# Patient Record
Sex: FEMALE | Race: WHITE | Employment: UNEMPLOYED | URBAN - METROPOLITAN AREA
[De-identification: names, ages, dates, MRNs, and addresses within clinical notes are randomized per-mention and may not be internally consistent; named-entity substitution may affect disease eponyms.]

---

## 2017-02-21 DIAGNOSIS — I10 ESSENTIAL HYPERTENSION: ICD-10-CM

## 2017-02-22 RX ORDER — AMLODIPINE BESYLATE 5 MG/1
TABLET ORAL
Qty: 90 TABLET | Refills: 0 | Status: SHIPPED | OUTPATIENT
Start: 2017-02-22 | End: 2017-05-26 | Stop reason: SDUPTHER

## 2017-02-24 ENCOUNTER — OFFICE VISIT (OUTPATIENT)
Dept: FAMILY MEDICINE CLINIC | Age: 32
End: 2017-02-24

## 2017-02-24 VITALS
SYSTOLIC BLOOD PRESSURE: 134 MMHG | DIASTOLIC BLOOD PRESSURE: 89 MMHG | HEART RATE: 80 BPM | WEIGHT: 270 LBS | BODY MASS INDEX: 42.38 KG/M2 | HEIGHT: 67 IN

## 2017-02-24 DIAGNOSIS — I10 ESSENTIAL HYPERTENSION: ICD-10-CM

## 2017-02-24 DIAGNOSIS — G43.109 MIGRAINE WITH AURA AND WITHOUT STATUS MIGRAINOSUS, NOT INTRACTABLE: ICD-10-CM

## 2017-02-24 DIAGNOSIS — E66.01 SEVERE OBESITY (BMI 35.0-35.9 WITH COMORBIDITY) (HCC): Primary | ICD-10-CM

## 2017-02-24 LAB
A/G RATIO: 1.8 (ref 1.1–2.2)
ALBUMIN SERPL-MCNC: 4.7 G/DL (ref 3.4–5)
ALP BLD-CCNC: 51 U/L (ref 40–129)
ALT SERPL-CCNC: 29 U/L (ref 10–40)
ANION GAP SERPL CALCULATED.3IONS-SCNC: 17 MMOL/L (ref 3–16)
AST SERPL-CCNC: 30 U/L (ref 15–37)
BILIRUB SERPL-MCNC: 0.3 MG/DL (ref 0–1)
BUN BLDV-MCNC: 13 MG/DL (ref 7–20)
CALCIUM SERPL-MCNC: 9.5 MG/DL (ref 8.3–10.6)
CHLORIDE BLD-SCNC: 100 MMOL/L (ref 99–110)
CO2: 21 MMOL/L (ref 21–32)
CREAT SERPL-MCNC: 0.6 MG/DL (ref 0.6–1.1)
GFR AFRICAN AMERICAN: >60
GFR NON-AFRICAN AMERICAN: >60
GLOBULIN: 2.6 G/DL
GLUCOSE BLD-MCNC: 76 MG/DL (ref 70–99)
POTASSIUM SERPL-SCNC: 4.6 MMOL/L (ref 3.5–5.1)
SODIUM BLD-SCNC: 138 MMOL/L (ref 136–145)
TOTAL PROTEIN: 7.3 G/DL (ref 6.4–8.2)

## 2017-02-24 PROCEDURE — 36415 COLL VENOUS BLD VENIPUNCTURE: CPT | Performed by: FAMILY MEDICINE

## 2017-02-24 PROCEDURE — 99214 OFFICE O/P EST MOD 30 MIN: CPT | Performed by: FAMILY MEDICINE

## 2017-02-24 RX ORDER — TOPIRAMATE 25 MG/1
TABLET ORAL
Qty: 82 TABLET | Refills: 1 | Status: SHIPPED | OUTPATIENT
Start: 2017-02-24 | End: 2017-03-24 | Stop reason: SDUPTHER

## 2017-03-24 ENCOUNTER — OFFICE VISIT (OUTPATIENT)
Dept: FAMILY MEDICINE CLINIC | Age: 32
End: 2017-03-24

## 2017-03-24 VITALS
WEIGHT: 261 LBS | HEART RATE: 83 BPM | DIASTOLIC BLOOD PRESSURE: 85 MMHG | BODY MASS INDEX: 40.97 KG/M2 | SYSTOLIC BLOOD PRESSURE: 130 MMHG | HEIGHT: 67 IN

## 2017-03-24 DIAGNOSIS — E66.01 SEVERE OBESITY (BMI 35.0-35.9 WITH COMORBIDITY) (HCC): Primary | ICD-10-CM

## 2017-03-24 DIAGNOSIS — G43.109 MIGRAINE WITH AURA AND WITHOUT STATUS MIGRAINOSUS, NOT INTRACTABLE: ICD-10-CM

## 2017-03-24 DIAGNOSIS — I10 ESSENTIAL HYPERTENSION: ICD-10-CM

## 2017-03-24 LAB
A/G RATIO: 1.9 (ref 1.1–2.2)
ALBUMIN SERPL-MCNC: 4.9 G/DL (ref 3.4–5)
ALP BLD-CCNC: 53 U/L (ref 40–129)
ALT SERPL-CCNC: 22 U/L (ref 10–40)
ANION GAP SERPL CALCULATED.3IONS-SCNC: 14 MMOL/L (ref 3–16)
AST SERPL-CCNC: 23 U/L (ref 15–37)
BILIRUB SERPL-MCNC: <0.2 MG/DL (ref 0–1)
BUN BLDV-MCNC: 18 MG/DL (ref 7–20)
CALCIUM SERPL-MCNC: 9.7 MG/DL (ref 8.3–10.6)
CHLORIDE BLD-SCNC: 105 MMOL/L (ref 99–110)
CO2: 21 MMOL/L (ref 21–32)
CREAT SERPL-MCNC: 0.7 MG/DL (ref 0.6–1.1)
GFR AFRICAN AMERICAN: >60
GFR NON-AFRICAN AMERICAN: >60
GLOBULIN: 2.6 G/DL
GLUCOSE BLD-MCNC: 87 MG/DL (ref 70–99)
POTASSIUM SERPL-SCNC: 4.3 MMOL/L (ref 3.5–5.1)
SODIUM BLD-SCNC: 140 MMOL/L (ref 136–145)
TOTAL PROTEIN: 7.5 G/DL (ref 6.4–8.2)

## 2017-03-24 PROCEDURE — 99214 OFFICE O/P EST MOD 30 MIN: CPT | Performed by: FAMILY MEDICINE

## 2017-03-24 PROCEDURE — 36415 COLL VENOUS BLD VENIPUNCTURE: CPT | Performed by: FAMILY MEDICINE

## 2017-03-24 RX ORDER — TOPIRAMATE 50 MG/1
50 TABLET, FILM COATED ORAL 2 TIMES DAILY
Qty: 60 TABLET | Refills: 5 | Status: SHIPPED | OUTPATIENT
Start: 2017-03-24 | End: 2017-07-28 | Stop reason: SDUPTHER

## 2017-03-27 ENCOUNTER — TELEPHONE (OUTPATIENT)
Dept: FAMILY MEDICINE CLINIC | Age: 32
End: 2017-03-27

## 2017-03-30 ENCOUNTER — TELEPHONE (OUTPATIENT)
Dept: FAMILY MEDICINE CLINIC | Age: 32
End: 2017-03-30

## 2017-04-18 ENCOUNTER — OFFICE VISIT (OUTPATIENT)
Dept: DERMATOLOGY | Age: 32
End: 2017-04-18

## 2017-04-18 DIAGNOSIS — Z12.83 SCREENING EXAM FOR SKIN CANCER: ICD-10-CM

## 2017-04-18 DIAGNOSIS — L83 ACQUIRED ACANTHOSIS NIGRICANS: ICD-10-CM

## 2017-04-18 DIAGNOSIS — L72.11 PILAR CYST: ICD-10-CM

## 2017-04-18 DIAGNOSIS — D22.9 MULTIPLE BENIGN NEVI: Primary | ICD-10-CM

## 2017-04-18 DIAGNOSIS — D18.01 CHERRY ANGIOMA: ICD-10-CM

## 2017-04-18 PROCEDURE — 99243 OFF/OP CNSLTJ NEW/EST LOW 30: CPT | Performed by: DERMATOLOGY

## 2017-04-25 RX ORDER — HYDROCHLOROTHIAZIDE 12.5 MG/1
CAPSULE, GELATIN COATED ORAL
Qty: 90 CAPSULE | Refills: 0 | Status: SHIPPED | OUTPATIENT
Start: 2017-04-25 | End: 2017-07-28 | Stop reason: SDUPTHER

## 2017-05-26 DIAGNOSIS — I10 ESSENTIAL HYPERTENSION: ICD-10-CM

## 2017-05-26 RX ORDER — AMLODIPINE BESYLATE 5 MG/1
TABLET ORAL
Qty: 30 TABLET | Refills: 0 | Status: SHIPPED | OUTPATIENT
Start: 2017-05-26 | End: 2017-07-05 | Stop reason: SDUPTHER

## 2017-07-05 DIAGNOSIS — I10 ESSENTIAL HYPERTENSION: ICD-10-CM

## 2017-07-07 RX ORDER — AMLODIPINE BESYLATE 5 MG/1
TABLET ORAL
Qty: 30 TABLET | Refills: 0 | Status: SHIPPED | OUTPATIENT
Start: 2017-07-07 | End: 2017-07-28 | Stop reason: SDUPTHER

## 2017-07-28 ENCOUNTER — OFFICE VISIT (OUTPATIENT)
Dept: FAMILY MEDICINE CLINIC | Age: 32
End: 2017-07-28

## 2017-07-28 VITALS
HEIGHT: 67 IN | SYSTOLIC BLOOD PRESSURE: 122 MMHG | HEART RATE: 72 BPM | RESPIRATION RATE: 12 BRPM | DIASTOLIC BLOOD PRESSURE: 72 MMHG | BODY MASS INDEX: 40.81 KG/M2 | WEIGHT: 260 LBS

## 2017-07-28 DIAGNOSIS — I10 ESSENTIAL HYPERTENSION: ICD-10-CM

## 2017-07-28 DIAGNOSIS — G43.109 MIGRAINE WITH AURA AND WITHOUT STATUS MIGRAINOSUS, NOT INTRACTABLE: ICD-10-CM

## 2017-07-28 PROCEDURE — 99213 OFFICE O/P EST LOW 20 MIN: CPT | Performed by: FAMILY MEDICINE

## 2017-07-28 RX ORDER — HYDROCHLOROTHIAZIDE 12.5 MG/1
CAPSULE, GELATIN COATED ORAL
Qty: 90 CAPSULE | Refills: 1 | Status: SHIPPED | OUTPATIENT
Start: 2017-07-28 | End: 2018-02-02 | Stop reason: SDUPTHER

## 2017-07-28 RX ORDER — AMLODIPINE BESYLATE 5 MG/1
TABLET ORAL
Qty: 90 TABLET | Refills: 1 | Status: SHIPPED | OUTPATIENT
Start: 2017-07-28 | End: 2018-02-02 | Stop reason: SDUPTHER

## 2017-07-28 RX ORDER — TOPIRAMATE 50 MG/1
50 TABLET, FILM COATED ORAL 2 TIMES DAILY
Qty: 180 TABLET | Refills: 1 | Status: SHIPPED | OUTPATIENT
Start: 2017-07-28 | End: 2018-02-02 | Stop reason: SDUPTHER

## 2017-07-28 ASSESSMENT — PATIENT HEALTH QUESTIONNAIRE - PHQ9
1. LITTLE INTEREST OR PLEASURE IN DOING THINGS: 0
SUM OF ALL RESPONSES TO PHQ QUESTIONS 1-9: 0
2. FEELING DOWN, DEPRESSED OR HOPELESS: 0
SUM OF ALL RESPONSES TO PHQ9 QUESTIONS 1 & 2: 0

## 2017-08-25 ENCOUNTER — OFFICE VISIT (OUTPATIENT)
Dept: FAMILY MEDICINE CLINIC | Age: 32
End: 2017-08-25

## 2017-08-25 ENCOUNTER — TELEPHONE (OUTPATIENT)
Dept: FAMILY MEDICINE CLINIC | Age: 32
End: 2017-08-25

## 2017-08-25 VITALS
SYSTOLIC BLOOD PRESSURE: 138 MMHG | BODY MASS INDEX: 40.81 KG/M2 | HEIGHT: 67 IN | HEART RATE: 82 BPM | DIASTOLIC BLOOD PRESSURE: 80 MMHG | WEIGHT: 260 LBS

## 2017-08-25 DIAGNOSIS — M79.604 PAIN OF RIGHT LOWER EXTREMITY: Primary | ICD-10-CM

## 2017-08-25 LAB — D DIMER: <200 NG/ML DDU (ref 0–229)

## 2017-08-25 PROCEDURE — 99214 OFFICE O/P EST MOD 30 MIN: CPT | Performed by: FAMILY MEDICINE

## 2018-02-02 ENCOUNTER — OFFICE VISIT (OUTPATIENT)
Dept: FAMILY MEDICINE CLINIC | Age: 33
End: 2018-02-02

## 2018-02-02 VITALS
HEART RATE: 89 BPM | RESPIRATION RATE: 12 BRPM | DIASTOLIC BLOOD PRESSURE: 72 MMHG | BODY MASS INDEX: 42.22 KG/M2 | WEIGHT: 269 LBS | SYSTOLIC BLOOD PRESSURE: 126 MMHG | HEIGHT: 67 IN | OXYGEN SATURATION: 98 %

## 2018-02-02 DIAGNOSIS — I10 ESSENTIAL HYPERTENSION: Primary | ICD-10-CM

## 2018-02-02 DIAGNOSIS — G43.109 MIGRAINE WITH AURA AND WITHOUT STATUS MIGRAINOSUS, NOT INTRACTABLE: ICD-10-CM

## 2018-02-02 LAB
ANION GAP SERPL CALCULATED.3IONS-SCNC: 14 MMOL/L (ref 3–16)
BUN BLDV-MCNC: 16 MG/DL (ref 7–20)
CALCIUM SERPL-MCNC: 9.4 MG/DL (ref 8.3–10.6)
CHLORIDE BLD-SCNC: 104 MMOL/L (ref 99–110)
CO2: 24 MMOL/L (ref 21–32)
CREAT SERPL-MCNC: 0.7 MG/DL (ref 0.6–1.1)
GFR AFRICAN AMERICAN: >60
GFR NON-AFRICAN AMERICAN: >60
GLUCOSE BLD-MCNC: 89 MG/DL (ref 70–99)
POTASSIUM SERPL-SCNC: 4.3 MMOL/L (ref 3.5–5.1)
SODIUM BLD-SCNC: 142 MMOL/L (ref 136–145)

## 2018-02-02 PROCEDURE — 99213 OFFICE O/P EST LOW 20 MIN: CPT | Performed by: FAMILY MEDICINE

## 2018-02-02 RX ORDER — HYDROCHLOROTHIAZIDE 12.5 MG/1
CAPSULE, GELATIN COATED ORAL
Qty: 90 CAPSULE | Refills: 1 | Status: SHIPPED | OUTPATIENT
Start: 2018-02-02 | End: 2018-09-01 | Stop reason: SDUPTHER

## 2018-02-02 RX ORDER — TOPIRAMATE 50 MG/1
50 TABLET, FILM COATED ORAL 2 TIMES DAILY
Qty: 180 TABLET | Refills: 1 | Status: SHIPPED | OUTPATIENT
Start: 2018-02-02 | End: 2018-09-21 | Stop reason: ALTCHOICE

## 2018-02-02 RX ORDER — AMLODIPINE BESYLATE 5 MG/1
TABLET ORAL
Qty: 90 TABLET | Refills: 1 | Status: SHIPPED | OUTPATIENT
Start: 2018-02-02 | End: 2018-09-01 | Stop reason: SDUPTHER

## 2018-02-02 NOTE — PROGRESS NOTES
Subjective:      Patient ID: German Willis is a 33 y.o. female. HPI    CC:  HTN, migraine    Chronic Headache:  Patient presents for follow-up of migraine headache. Episodes have been occuring about 0 times per day, and have been stable over time. Recent change in symptom quality or new associated symptoms:  no.  Current triggers:  nothing in particular. Current abortive treatment includes triptan- imitrex. Preventive treatment: anti-convulsant- topamax 50mg BID. Medication side effects: none. Emergency department/urgent care visits for headache since last visit: none. Recent diagnostic testing: none. Hypertension:  Home blood pressure monitoring: No.  She is adherent to a low sodium diet. Patient denies chest pain, shortness of breath, headache, lightheadedness, blurred vision, peripheral edema, palpitations, dry cough and fatigue. Antihypertensive medication side effects: no medication side effects noted. Use of agents associated with hypertension: none. Pt did gain weight: She states she was going out to eat too much with coworkers at lunch. Recently she's been packing her lunch. She also signed up with the program with a dietician at [employer]. It's a 9 week program in which they help teach her about food and help go shopping with you to make sure you're making healthy food choices.     Sodium (mmol/L)   Date Value   03/24/2017 140    BUN (mg/dL)   Date Value   03/24/2017 18    Glucose (mg/dL)   Date Value   03/24/2017 87      Potassium (mmol/L)   Date Value   03/24/2017 4.3    CREATININE (mg/dL)   Date Value   03/24/2017 0.7           Vitals:    02/02/18 0755   BP: 126/72   Pulse: 89   Resp: 12   SpO2: 98%   Weight: 269 lb (122 kg)   Height: 5' 7\" (1.702 m)       Outpatient Prescriptions Marked as Taking for the 2/2/18 encounter (Office Visit) with Mariya Hernandez MD   Medication Sig Dispense Refill    amLODIPine (NORVASC) 5 MG tablet TAKE ONE

## 2018-02-04 ENCOUNTER — TELEPHONE (OUTPATIENT)
Dept: FAMILY MEDICINE CLINIC | Age: 33
End: 2018-02-04

## 2018-04-24 ENCOUNTER — OFFICE VISIT (OUTPATIENT)
Dept: DERMATOLOGY | Age: 33
End: 2018-04-24

## 2018-04-24 DIAGNOSIS — Z12.83 SCREENING EXAM FOR SKIN CANCER: ICD-10-CM

## 2018-04-24 DIAGNOSIS — D22.9 MULTIPLE BENIGN NEVI: Primary | ICD-10-CM

## 2018-04-24 PROCEDURE — 99213 OFFICE O/P EST LOW 20 MIN: CPT | Performed by: DERMATOLOGY

## 2018-09-01 DIAGNOSIS — I10 ESSENTIAL HYPERTENSION: ICD-10-CM

## 2018-09-04 RX ORDER — HYDROCHLOROTHIAZIDE 12.5 MG/1
CAPSULE, GELATIN COATED ORAL
Qty: 90 CAPSULE | Refills: 0 | Status: SHIPPED | OUTPATIENT
Start: 2018-09-04 | End: 2018-12-05 | Stop reason: SDUPTHER

## 2018-09-04 RX ORDER — AMLODIPINE BESYLATE 5 MG/1
TABLET ORAL
Qty: 90 TABLET | Refills: 0 | Status: SHIPPED | OUTPATIENT
Start: 2018-09-04 | End: 2018-12-11 | Stop reason: SDUPTHER

## 2018-09-21 ENCOUNTER — OFFICE VISIT (OUTPATIENT)
Dept: FAMILY MEDICINE CLINIC | Age: 33
End: 2018-09-21

## 2018-09-21 ENCOUNTER — TELEPHONE (OUTPATIENT)
Dept: FAMILY MEDICINE CLINIC | Age: 33
End: 2018-09-21

## 2018-09-21 VITALS
HEART RATE: 68 BPM | DIASTOLIC BLOOD PRESSURE: 79 MMHG | HEIGHT: 67 IN | BODY MASS INDEX: 39.87 KG/M2 | SYSTOLIC BLOOD PRESSURE: 120 MMHG | WEIGHT: 254 LBS | RESPIRATION RATE: 12 BRPM

## 2018-09-21 DIAGNOSIS — Z23 FLU VACCINE NEED: ICD-10-CM

## 2018-09-21 DIAGNOSIS — Z13.220 SCREENING FOR HYPERLIPIDEMIA: ICD-10-CM

## 2018-09-21 DIAGNOSIS — I10 ESSENTIAL HYPERTENSION: Primary | ICD-10-CM

## 2018-09-21 LAB
ANION GAP SERPL CALCULATED.3IONS-SCNC: 15 MMOL/L (ref 3–16)
BUN BLDV-MCNC: 14 MG/DL (ref 7–20)
CALCIUM SERPL-MCNC: 9.7 MG/DL (ref 8.3–10.6)
CHLORIDE BLD-SCNC: 101 MMOL/L (ref 99–110)
CHOLESTEROL, TOTAL: 128 MG/DL (ref 0–199)
CO2: 23 MMOL/L (ref 21–32)
CREAT SERPL-MCNC: 0.7 MG/DL (ref 0.6–1.1)
GFR AFRICAN AMERICAN: >60
GFR NON-AFRICAN AMERICAN: >60
GLUCOSE BLD-MCNC: 84 MG/DL (ref 70–99)
HDLC SERPL-MCNC: 39 MG/DL (ref 40–60)
LDL CHOLESTEROL CALCULATED: 75 MG/DL
POTASSIUM SERPL-SCNC: 4.2 MMOL/L (ref 3.5–5.1)
SODIUM BLD-SCNC: 139 MMOL/L (ref 136–145)
TRIGL SERPL-MCNC: 69 MG/DL (ref 0–150)
VLDLC SERPL CALC-MCNC: 14 MG/DL

## 2018-09-21 PROCEDURE — 90686 IIV4 VACC NO PRSV 0.5 ML IM: CPT | Performed by: FAMILY MEDICINE

## 2018-09-21 PROCEDURE — 90471 IMMUNIZATION ADMIN: CPT | Performed by: FAMILY MEDICINE

## 2018-09-21 PROCEDURE — 99213 OFFICE O/P EST LOW 20 MIN: CPT | Performed by: FAMILY MEDICINE

## 2018-09-21 ASSESSMENT — PATIENT HEALTH QUESTIONNAIRE - PHQ9
SUM OF ALL RESPONSES TO PHQ9 QUESTIONS 1 & 2: 0
1. LITTLE INTEREST OR PLEASURE IN DOING THINGS: 0
SUM OF ALL RESPONSES TO PHQ QUESTIONS 1-9: 0
2. FEELING DOWN, DEPRESSED OR HOPELESS: 0
SUM OF ALL RESPONSES TO PHQ QUESTIONS 1-9: 0

## 2018-09-21 NOTE — TELEPHONE ENCOUNTER
Please call and inform patient that all blood work is normal, except good cholesterol is slightly low    Patient should work on increasing exercise to help this    Please document call and then close encounter.   thanks

## 2018-09-26 ENCOUNTER — TELEPHONE (OUTPATIENT)
Dept: FAMILY MEDICINE CLINIC | Age: 33
End: 2018-09-26

## 2018-09-26 NOTE — TELEPHONE ENCOUNTER
Left message on machine for patient to call back. Received pap smear result from 2014, wanted to let pt know that she is aware her result is abnormal and follow up on it.

## 2018-09-26 NOTE — TELEPHONE ENCOUNTER
PT called in I gave her the prior note, She said she will call her OBGYN but would also like Lexi Gregory to give her a call back.

## 2018-10-01 NOTE — TELEPHONE ENCOUNTER
Pt states she know that her 2014 lab was abnormal and has been following up on it.   She is now normal.

## 2018-12-05 DIAGNOSIS — I10 ESSENTIAL HYPERTENSION: ICD-10-CM

## 2018-12-05 RX ORDER — HYDROCHLOROTHIAZIDE 12.5 MG/1
CAPSULE, GELATIN COATED ORAL
Qty: 90 CAPSULE | Refills: 1 | Status: SHIPPED | OUTPATIENT
Start: 2018-12-05 | End: 2019-05-01 | Stop reason: SDUPTHER

## 2018-12-11 DIAGNOSIS — I10 ESSENTIAL HYPERTENSION: ICD-10-CM

## 2018-12-12 RX ORDER — AMLODIPINE BESYLATE 5 MG/1
TABLET ORAL
Qty: 90 TABLET | Refills: 0 | Status: SHIPPED | OUTPATIENT
Start: 2018-12-12 | End: 2019-03-10 | Stop reason: SDUPTHER

## 2019-01-25 ENCOUNTER — OFFICE VISIT (OUTPATIENT)
Dept: FAMILY MEDICINE CLINIC | Age: 34
End: 2019-01-25
Payer: COMMERCIAL

## 2019-01-25 VITALS
HEIGHT: 67 IN | BODY MASS INDEX: 36.65 KG/M2 | SYSTOLIC BLOOD PRESSURE: 118 MMHG | HEART RATE: 57 BPM | WEIGHT: 233.5 LBS | OXYGEN SATURATION: 98 % | DIASTOLIC BLOOD PRESSURE: 74 MMHG

## 2019-01-25 DIAGNOSIS — Z83.2 FAMILY HISTORY OF FACTOR V LEIDEN MUTATION: ICD-10-CM

## 2019-01-25 DIAGNOSIS — I83.899 RUPTURED VARICOSE VEIN: Primary | ICD-10-CM

## 2019-01-25 LAB
REASON FOR REJECTION: NORMAL
REJECTED TEST: NORMAL

## 2019-01-25 PROCEDURE — 99213 OFFICE O/P EST LOW 20 MIN: CPT | Performed by: FAMILY MEDICINE

## 2019-01-28 ENCOUNTER — TELEPHONE (OUTPATIENT)
Dept: FAMILY MEDICINE CLINIC | Age: 34
End: 2019-01-28

## 2019-03-10 DIAGNOSIS — I10 ESSENTIAL HYPERTENSION: ICD-10-CM

## 2019-03-11 RX ORDER — AMLODIPINE BESYLATE 5 MG/1
TABLET ORAL
Qty: 90 TABLET | Refills: 0 | Status: SHIPPED | OUTPATIENT
Start: 2019-03-11 | End: 2019-05-01 | Stop reason: SDUPTHER

## 2019-04-23 ENCOUNTER — OFFICE VISIT (OUTPATIENT)
Dept: DERMATOLOGY | Age: 34
End: 2019-04-23
Payer: COMMERCIAL

## 2019-04-23 DIAGNOSIS — D22.9 MULTIPLE BENIGN NEVI: Primary | ICD-10-CM

## 2019-04-23 DIAGNOSIS — Z12.83 SCREENING EXAM FOR SKIN CANCER: ICD-10-CM

## 2019-04-23 PROCEDURE — 99213 OFFICE O/P EST LOW 20 MIN: CPT | Performed by: DERMATOLOGY

## 2019-05-01 ENCOUNTER — OFFICE VISIT (OUTPATIENT)
Dept: FAMILY MEDICINE CLINIC | Age: 34
End: 2019-05-01
Payer: COMMERCIAL

## 2019-05-01 VITALS
DIASTOLIC BLOOD PRESSURE: 77 MMHG | SYSTOLIC BLOOD PRESSURE: 124 MMHG | HEART RATE: 76 BPM | BODY MASS INDEX: 37.04 KG/M2 | WEIGHT: 236 LBS | HEIGHT: 67 IN

## 2019-05-01 DIAGNOSIS — Z83.2 FAMILY HISTORY OF FACTOR V DEFICIENCY: ICD-10-CM

## 2019-05-01 DIAGNOSIS — I10 ESSENTIAL HYPERTENSION: Primary | ICD-10-CM

## 2019-05-01 LAB
ANION GAP SERPL CALCULATED.3IONS-SCNC: 14 MMOL/L (ref 3–16)
BUN BLDV-MCNC: 16 MG/DL (ref 7–20)
CALCIUM SERPL-MCNC: 9.7 MG/DL (ref 8.3–10.6)
CHLORIDE BLD-SCNC: 103 MMOL/L (ref 99–110)
CO2: 26 MMOL/L (ref 21–32)
CREAT SERPL-MCNC: 0.6 MG/DL (ref 0.6–1.1)
GFR AFRICAN AMERICAN: >60
GFR NON-AFRICAN AMERICAN: >60
GLUCOSE BLD-MCNC: 92 MG/DL (ref 70–99)
POTASSIUM SERPL-SCNC: 4.2 MMOL/L (ref 3.5–5.1)
SODIUM BLD-SCNC: 143 MMOL/L (ref 136–145)

## 2019-05-01 PROCEDURE — 99213 OFFICE O/P EST LOW 20 MIN: CPT | Performed by: FAMILY MEDICINE

## 2019-05-01 RX ORDER — HYDROCHLOROTHIAZIDE 12.5 MG/1
CAPSULE, GELATIN COATED ORAL
Qty: 90 CAPSULE | Refills: 1 | Status: SHIPPED | OUTPATIENT
Start: 2019-05-01 | End: 2021-03-02

## 2019-05-01 RX ORDER — AMLODIPINE BESYLATE 5 MG/1
TABLET ORAL
Qty: 90 TABLET | Refills: 1 | Status: SHIPPED | OUTPATIENT
Start: 2019-05-01 | End: 2019-12-17 | Stop reason: SDUPTHER

## 2019-05-01 ASSESSMENT — PATIENT HEALTH QUESTIONNAIRE - PHQ9
SUM OF ALL RESPONSES TO PHQ QUESTIONS 1-9: 0
SUM OF ALL RESPONSES TO PHQ9 QUESTIONS 1 & 2: 0
SUM OF ALL RESPONSES TO PHQ QUESTIONS 1-9: 0
1. LITTLE INTEREST OR PLEASURE IN DOING THINGS: 0
2. FEELING DOWN, DEPRESSED OR HOPELESS: 0

## 2019-05-01 NOTE — PROGRESS NOTES
Subjective:      Patient ID: Mao Guzman is a 29 y.o. female. CC:  HTN, family h/o factor V leiden      Hypertension:  Home blood pressure monitoring: No.  She is adherent to a low sodium diet. Patient denies chest pain, shortness of breath, headache, lightheadedness, blurred vision, peripheral edema, palpitations, dry cough and fatigue. Antihypertensive medication side effects: no medication side effects noted. Use of agents associated with hypertension: none. Pt is eating lower carbs. She walks regularly for exercise    Sodium (mmol/L)   Date Value   09/21/2018 139    BUN (mg/dL)   Date Value   09/21/2018 14    Glucose (mg/dL)   Date Value   09/21/2018 84      Potassium (mmol/L)   Date Value   09/21/2018 4.2    CREATININE (mg/dL)   Date Value   09/21/2018 0.7           family h/o factor V leiden  Patient's mother and sister are carriers her factor V Leiden and mother has to be on blood thinners for this. Patient would like to be screened. She has never had clots. Vitals:    05/01/19 1007   BP: 124/77   Pulse: 76   Weight: 236 lb (107 kg)   Height: 5' 7\" (1.702 m)       Outpatient Medications Marked as Taking for the 5/1/19 encounter (Office Visit) with Leann Rowe MD   Medication Sig Dispense Refill    amLODIPine (NORVASC) 5 MG tablet TAKE ONE TABLET BY MOUTH DAILY 90 tablet 0    hydrochlorothiazide (MICROZIDE) 12.5 MG capsule TAKE ONE CAPSULE BY MOUTH DAILY 90 capsule 1    Multiple Vitamins-Minerals (ALIVE WOMENS ENERGY PO) Take by mouth         Past Medical History:   Diagnosis Date    Family history of factor V deficiency     HTN (hypertension)     Migraine     Severe obesity (BMI 35.0-35.9 with comorbidity) ([address]        No past surgical history on file.     Social History     Tobacco Use    Smoking status: Never Smoker    Smokeless tobacco: Never Used   Substance Use Topics    Alcohol use: No     Comment: once monthly       Family History   Problem Relation Age of Onset    High Cholesterol Father     Cancer Paternal Grandmother         skin, NMSC           Review of Systems  See hpi      Objective:   Physical Exam  Constitutional:   · Reviewed vitals above  · Well Nourished, well developed, no distress       Neck:  · Symmetric and without masses  · No thyromegaly  Resp:  · Normal effort  · Clear to auscultation bilaterally without rhonchi, wheezing or crackles  Cardiovascular:  · On auscultation, normal S1 and S2 without murmurs, rubs or gallops  · No bruits of bilateral carotids and no JVD  Gastrointestinal:  · Nontender, nondistended, and no masses  · No hepatosplenomegaly  Musculoskeletal:  · All extremities without clubbing, cyanosis or edema  Skin:  · No rashes on inspection  Psych:  · Normal mood and affect  · Normal insight and judgement    Assessment:      See below      Plan:      1. Essential hypertension  At goal, continue medicine (norvasc 5mg, HCTZ 12.5mg). Checking BMP        2.  Family history of factor V deficiency  Checking factor V leiden        HM:      Pap smear UTD    Has last Tdap when she was pregnant

## 2019-05-04 LAB
FACTOR V LEIDEN: NEGATIVE
SPECIMEN: NORMAL

## 2019-06-24 ENCOUNTER — TELEPHONE (OUTPATIENT)
Dept: FAMILY MEDICINE CLINIC | Age: 34
End: 2019-06-24

## 2019-06-25 NOTE — TELEPHONE ENCOUNTER
Hazel,    Pt's insurance is denying to pay for the Factor V leiden test    Please see if we can appeal.  I placed info on your desk

## 2019-12-17 DIAGNOSIS — I10 ESSENTIAL HYPERTENSION: ICD-10-CM

## 2019-12-18 RX ORDER — AMLODIPINE BESYLATE 5 MG/1
TABLET ORAL
Qty: 30 TABLET | Refills: 0 | Status: SHIPPED | OUTPATIENT
Start: 2019-12-18 | End: 2021-03-02

## 2020-08-05 ENCOUNTER — APPOINTMENT (OUTPATIENT)
Dept: ULTRASOUND IMAGING | Age: 35
End: 2020-08-05
Payer: COMMERCIAL

## 2020-08-05 ENCOUNTER — HOSPITAL ENCOUNTER (EMERGENCY)
Age: 35
Discharge: HOME | End: 2020-08-05
Payer: COMMERCIAL

## 2020-08-05 ENCOUNTER — APPOINTMENT (OUTPATIENT)
Dept: CT IMAGING | Age: 35
End: 2020-08-05
Payer: COMMERCIAL

## 2020-08-05 VITALS
OXYGEN SATURATION: 96 % | DIASTOLIC BLOOD PRESSURE: 95 MMHG | RESPIRATION RATE: 16 BRPM | SYSTOLIC BLOOD PRESSURE: 145 MMHG | TEMPERATURE: 98.4 F | HEART RATE: 78 BPM

## 2020-08-05 LAB
A/G RATIO: 1.4 (ref 1.1–2.2)
ALBUMIN SERPL-MCNC: 4.3 G/DL (ref 3.4–5)
ALP BLD-CCNC: 47 U/L (ref 40–129)
ALT SERPL-CCNC: 24 U/L (ref 10–40)
ANION GAP SERPL CALCULATED.3IONS-SCNC: 13 MMOL/L (ref 3–16)
AST SERPL-CCNC: 23 U/L (ref 15–37)
BASOPHILS ABSOLUTE: 0 K/UL (ref 0–0.2)
BASOPHILS RELATIVE PERCENT: 0.4 %
BILIRUB SERPL-MCNC: 0.3 MG/DL (ref 0–1)
BILIRUBIN URINE: NEGATIVE
BLOOD, URINE: NEGATIVE
BUN BLDV-MCNC: 18 MG/DL (ref 7–20)
CALCIUM SERPL-MCNC: 9.4 MG/DL (ref 8.3–10.6)
CHLORIDE BLD-SCNC: 104 MMOL/L (ref 99–110)
CLARITY: ABNORMAL
CO2: 23 MMOL/L (ref 21–32)
COLOR: YELLOW
CREAT SERPL-MCNC: 0.7 MG/DL (ref 0.6–1.1)
EOSINOPHILS ABSOLUTE: 0.1 K/UL (ref 0–0.6)
EOSINOPHILS RELATIVE PERCENT: 0.5 %
EPITHELIAL CELLS, UA: 4 /HPF (ref 0–5)
GFR AFRICAN AMERICAN: >60
GFR NON-AFRICAN AMERICAN: >60
GLOBULIN: 3.1 G/DL
GLUCOSE BLD-MCNC: 102 MG/DL (ref 70–99)
GLUCOSE URINE: NEGATIVE MG/DL
HCG QUALITATIVE: NEGATIVE
HCT VFR BLD CALC: 45.5 % (ref 36–48)
HEMOGLOBIN: 15.1 G/DL (ref 12–16)
HYALINE CASTS: 4 /LPF (ref 0–8)
KETONES, URINE: NEGATIVE MG/DL
LEUKOCYTE ESTERASE, URINE: ABNORMAL
LIPASE: 28 U/L (ref 13–60)
LYMPHOCYTES ABSOLUTE: 2.3 K/UL (ref 1–5.1)
LYMPHOCYTES RELATIVE PERCENT: 24 %
MCH RBC QN AUTO: 28.7 PG (ref 26–34)
MCHC RBC AUTO-ENTMCNC: 33.3 G/DL (ref 31–36)
MCV RBC AUTO: 86.2 FL (ref 80–100)
MICROSCOPIC EXAMINATION: YES
MONOCYTES ABSOLUTE: 0.6 K/UL (ref 0–1.3)
MONOCYTES RELATIVE PERCENT: 6.1 %
NEUTROPHILS ABSOLUTE: 6.5 K/UL (ref 1.7–7.7)
NEUTROPHILS RELATIVE PERCENT: 69 %
NITRITE, URINE: NEGATIVE
PDW BLD-RTO: 13.6 % (ref 12.4–15.4)
PH UA: 7 (ref 5–8)
PLATELET # BLD: 226 K/UL (ref 135–450)
PMV BLD AUTO: 7.8 FL (ref 5–10.5)
POTASSIUM REFLEX MAGNESIUM: 4.1 MMOL/L (ref 3.5–5.1)
PROTEIN UA: NEGATIVE MG/DL
RBC # BLD: 5.27 M/UL (ref 4–5.2)
RBC UA: 2 /HPF (ref 0–4)
SODIUM BLD-SCNC: 140 MMOL/L (ref 136–145)
SPECIFIC GRAVITY UA: 1.02 (ref 1–1.03)
TOTAL PROTEIN: 7.4 G/DL (ref 6.4–8.2)
URINE REFLEX TO CULTURE: YES
URINE TYPE: ABNORMAL
UROBILINOGEN, URINE: 0.2 E.U./DL
WBC # BLD: 9.5 K/UL (ref 4–11)
WBC UA: 18 /HPF (ref 0–5)

## 2020-08-05 PROCEDURE — 85025 COMPLETE CBC W/AUTO DIFF WBC: CPT

## 2020-08-05 PROCEDURE — 6370000000 HC RX 637 (ALT 250 FOR IP): Performed by: NURSE PRACTITIONER

## 2020-08-05 PROCEDURE — 80053 COMPREHEN METABOLIC PANEL: CPT

## 2020-08-05 PROCEDURE — 81001 URINALYSIS AUTO W/SCOPE: CPT

## 2020-08-05 PROCEDURE — 76705 ECHO EXAM OF ABDOMEN: CPT

## 2020-08-05 PROCEDURE — 83690 ASSAY OF LIPASE: CPT

## 2020-08-05 PROCEDURE — 99284 EMERGENCY DEPT VISIT MOD MDM: CPT

## 2020-08-05 PROCEDURE — 84703 CHORIONIC GONADOTROPIN ASSAY: CPT

## 2020-08-05 PROCEDURE — 74176 CT ABD & PELVIS W/O CONTRAST: CPT

## 2020-08-05 PROCEDURE — 87086 URINE CULTURE/COLONY COUNT: CPT

## 2020-08-05 RX ORDER — CEPHALEXIN 500 MG/1
500 CAPSULE ORAL 2 TIMES DAILY
Qty: 14 CAPSULE | Refills: 0 | Status: SHIPPED | OUTPATIENT
Start: 2020-08-05 | End: 2020-08-12

## 2020-08-05 RX ORDER — IBUPROFEN 800 MG/1
800 TABLET ORAL EVERY 8 HOURS PRN
Qty: 20 TABLET | Refills: 0 | Status: SHIPPED | OUTPATIENT
Start: 2020-08-05 | End: 2022-04-28

## 2020-08-05 RX ORDER — METHOCARBAMOL 500 MG/1
500 TABLET, FILM COATED ORAL 4 TIMES DAILY
Qty: 40 TABLET | Refills: 0 | Status: SHIPPED | OUTPATIENT
Start: 2020-08-05 | End: 2020-08-15

## 2020-08-05 RX ORDER — DICYCLOMINE HYDROCHLORIDE 10 MG/1
10 CAPSULE ORAL ONCE
Status: COMPLETED | OUTPATIENT
Start: 2020-08-05 | End: 2020-08-05

## 2020-08-05 RX ORDER — ACETAMINOPHEN 500 MG
1000 TABLET ORAL 4 TIMES DAILY PRN
Qty: 60 TABLET | Refills: 0 | Status: SHIPPED | OUTPATIENT
Start: 2020-08-05 | End: 2022-04-28

## 2020-08-05 RX ORDER — ACETAMINOPHEN 500 MG
1000 TABLET ORAL ONCE
Status: COMPLETED | OUTPATIENT
Start: 2020-08-05 | End: 2020-08-05

## 2020-08-05 RX ADMIN — DICYCLOMINE HYDROCHLORIDE 10 MG: 10 CAPSULE ORAL at 15:09

## 2020-08-05 RX ADMIN — ACETAMINOPHEN 1000 MG: 500 TABLET, FILM COATED ORAL at 15:09

## 2020-08-05 ASSESSMENT — PAIN SCALES - GENERAL
PAINLEVEL_OUTOF10: 5
PAINLEVEL_OUTOF10: 5
PAINLEVEL_OUTOF10: 4

## 2020-08-05 ASSESSMENT — PAIN DESCRIPTION - DESCRIPTORS: DESCRIPTORS: ACHING;STABBING

## 2020-08-05 ASSESSMENT — PAIN DESCRIPTION - ORIENTATION: ORIENTATION: RIGHT

## 2020-08-05 ASSESSMENT — PAIN SCALES - WONG BAKER: WONGBAKER_NUMERICALRESPONSE: 6

## 2020-08-05 ASSESSMENT — PAIN DESCRIPTION - FREQUENCY: FREQUENCY: INTERMITTENT

## 2020-08-05 ASSESSMENT — PAIN DESCRIPTION - PAIN TYPE: TYPE: ACUTE PAIN

## 2020-08-05 ASSESSMENT — PAIN DESCRIPTION - PROGRESSION: CLINICAL_PROGRESSION: NOT CHANGED

## 2020-08-05 ASSESSMENT — PAIN - FUNCTIONAL ASSESSMENT: PAIN_FUNCTIONAL_ASSESSMENT: ACTIVITIES ARE NOT PREVENTED

## 2020-08-05 ASSESSMENT — PAIN DESCRIPTION - LOCATION: LOCATION: ABDOMEN

## 2020-08-05 ASSESSMENT — PAIN DESCRIPTION - ONSET: ONSET: ON-GOING

## 2020-08-05 NOTE — ED PROVIDER NOTES
[address] [address] [address] [facility] ([place], [place]) [address]  Dept: [phone]  Loc: [address] ENCOUNTER        This patient was not seen or evaluated by the attending physician. I evaluated this patient, the attending physician was available for consultation. CHIEF COMPLAINT    Chief Complaint   Patient presents with    Abdominal Pain     right       HPI    John Duke is a 30 y.o. female who presents with abdominal pain localized in the right upper quadrant of the abdomen. Onset was several days ago. She states that she originally thought it was because she had to help lift her daughter because she was in a cast for her leg. However she states that she has stopped doing that over the past 2 to 3 days and has had constant, with ebbs and flows of discomfort pain. Does not necessarily worsen after meals. The pain is 5/10 in severity. The quality of the pain is sharp and cramping. The context is that the symptoms started spontaneously. There are no alleviating factors. Denies any vomiting or diarrhea. Denies any dysuria, gross hematuria, urinary urgency or frequency. REVIEW OF SYSTEMS    GI: see HPI, no vomiting, no diarrhea, no hematochezia  Cardiac: No chest pain, shortness of breath, palpitations or syncope  Pulmonary: No difficulty breathing or new cough  General: No fevers  : No dysuria, No hematuria  See HPI for further details. All other systems reviewed and are negative. PAST MEDICAL & SURGICAL HISTORY    Past Medical History:   Diagnosis Date    Family history of factor V deficiency     HTN (hypertension)     Migraine     Severe obesity (BMI 35.0-35.9 with comorbidity) ([address]      No past surgical history on file.     CURRENT MEDICATIONS  (may include discharge medications prescribed in the ED)  Current Outpatient Rx   Medication Sig Dispense Refill    cephALEXin (KEFLEX) 500 MG capsule Take 1 capsule by mouth 2 times daily for 7 days 14 capsule 0    methocarbamol (ROBAXIN) 500 MG tablet Take 1 tablet by mouth 4 times daily for 10 days 40 tablet 0    ibuprofen (IBU) 800 MG tablet Take 1 tablet by mouth every 8 hours as needed for Pain 20 tablet 0    acetaminophen (TYLENOL) 500 MG tablet Take 2 tablets by mouth 4 times daily as needed for Pain 60 tablet 0    amLODIPine (NORVASC) 5 MG tablet TAKE ONE TABLET BY MOUTH DAILY 30 tablet 0    hydrochlorothiazide (MICROZIDE) 12.5 MG capsule TAKE ONE CAPSULE BY MOUTH DAILY 90 capsule 1    Multiple Vitamins-Minerals (ALIVE WOMENS ENERGY PO) Take by mouth      SUMAtriptan (IMITREX) 25 MG tablet Take 1 tablet by mouth once as needed for Migraine 9 tablet 1       ALLERGIES    No Known Allergies    SOCIAL AND FAMILY HISTORY    Social History     Socioeconomic History    Marital status:      Spouse name: Karen Choi Number of children: 2    Years of education: Not on file    Highest education level: Not on file   Occupational History    Occupation: Marketing - [employer]   Social Needs    Financial resource strain: Not on file    Food insecurity     Worry: Not on file     Inability: Not on file   Healthy [employer] needs     Medical: Not on file     Non-medical: Not on file   Tobacco Use    Smoking status: Never Smoker    Smokeless tobacco: Never Used   Substance and Sexual Activity    Alcohol use: No     Comment: once monthly    Drug use: No    Sexual activity: Yes     Partners: Male     Comment: , 2 children   Lifestyle    Physical activity     Days per week: Not on file     Minutes per session: Not on file    Stress: Not on file   Relationships    Social connections     Talks on phone: Not on file     Gets together: Not on file     Attends Caodaism service: Not on file     Active member of club or organization: Not on file     Attends meetings of clubs or organizations: Not on file     Relationship status: Not on file    Intimate partner violence     Fear of current or ex partner: Not on file     Emotionally abused: Not on file     Physically abused: Not on file     Forced sexual activity: Not on file   Other Topics Concern    Not on file   Social History Narrative    Not on file     Family History   Problem Relation Age of Onset    High Cholesterol Father     Cancer Paternal Grandmother         skin, NMSC       PHYSICAL EXAM    VITAL SIGNS: BP (!) 145/95   Pulse 78   Temp 98.4 °F (36.9 °C) (Oral)   Resp 16   SpO2 96%   Constitutional:  Well developed, well nourished, no acute distress  Eyes:  Sclera nonicteric, conjunctiva moist  HENT:  Atraumatic, nose normal  Neck: no JVD  Respiratory:  No retractions, no accessory muscle use, normal breath sounds   Cardiovascular:  regular rate, no murmurs  GI: +RUQ abdominal tenderness to palpation at the border to the right lower diaphragm region, soft, no guarding, bowel sounds present, no audible bruits or palpable pulsatile masses  Back: no CVA tenderness  Musculoskeletal:  No edema, no acute deformities  Vascular: Radial and DP pulses 2+ and equal bilaterally  Integument: No rashes, skin dry  Neurologic:  Alert & oriented, no slurred speech  Psychiatric: Cooperative, pleasant affect     RADIOLOGY/PROCEDURES    US GALLBLADDER RUQ   Final Result   Unremarkable right upper quadrant ultrasound. CT ABDOMEN PELVIS WO CONTRAST Additional Contrast? None   Final Result   Negative             ED COURSE & MEDICAL DECISION MAKING    Pertinent Labs & Imaging studies reviewed and interpreted. (See chart for details)     See chart for details of medications given during the ED stay.     Vitals:    08/05/20 1328 08/05/20 1605 08/05/20 1609   BP: (!) 152/91 (!) 145/95 (!) 145/95   Pulse: 85 78    Resp: 18 16    Temp: 98.4 °F (36.9 °C)     TempSrc: Oral     SpO2: 99% 98% 96%       Differential diagnosis: Abdominal Aortic Aneurysm, Acute Coronary Syndrome, Ischemic Bowel, Bowel Obstruction (including Gastric return. Verbalized understanding, they have no further questions or concerns in agreement with this plan as well as the plan of discharge. FINAL Impression    1. Right upper quadrant abdominal pain    2. Urinary tract infection without hematuria, site unspecified        Blood pressure (!) 145/95, pulse 78, temperature 98.4 °F (36.9 °C), temperature source Oral, resp. rate 16, SpO2 96 %, not currently breastfeeding.      PLAN  Discharge with outpatient follow-up    (Please note that this note was completed with a voice recognition program.  Every attempt was made to edit the dictations, but inevitably there remain words that are mis-transcribed.)         Luke Boone, JORDY - CNP  08/05/20 5636

## 2020-08-05 NOTE — ED NOTES
Pt ambulated to Ultrasound at this time.      Azul Vera [facility]  08/05/20 4028

## 2020-08-06 LAB — URINE CULTURE, ROUTINE: NORMAL

## 2021-03-02 ENCOUNTER — OFFICE VISIT (OUTPATIENT)
Dept: DERMATOLOGY | Age: 36
End: 2021-03-02
Payer: COMMERCIAL

## 2021-03-02 VITALS — TEMPERATURE: 97.3 F

## 2021-03-02 DIAGNOSIS — R20.9 DISTURBANCE OF SKIN SENSATION: ICD-10-CM

## 2021-03-02 DIAGNOSIS — L91.8 SKIN TAG: ICD-10-CM

## 2021-03-02 DIAGNOSIS — Z12.83 SCREENING EXAM FOR SKIN CANCER: ICD-10-CM

## 2021-03-02 DIAGNOSIS — D22.9 MULTIPLE BENIGN NEVI: Primary | ICD-10-CM

## 2021-03-02 DIAGNOSIS — L72.11 PILAR CYST: ICD-10-CM

## 2021-03-02 PROCEDURE — 11200 RMVL SKIN TAGS UP TO&INC 15: CPT | Performed by: DERMATOLOGY

## 2021-03-02 PROCEDURE — 99214 OFFICE O/P EST MOD 30 MIN: CPT | Performed by: DERMATOLOGY

## 2021-03-02 NOTE — PROGRESS NOTES
[facility]) Dermatology  Vilma RodriguezDarvin  1985    30 y.o. female     Date of Visit: 3/2/2021    Last Visit: 2yr    Chief Complaint: Skin check    History of Present Illness:  1. Here for skin/mole check. No new moles. No moles changing in size, shape, color. No moles associated w/ pain, bleeding, pruritus.   -Uses SPF 30+ sunscreen regularly. 2. F/u pilar cysts of scalp. States that the lesion on back of scalp is not problematic but the one of the L side gets very tender whenever she has migraines, which is 1-2x/mo. 3. Complains of a tender skin tag on neck     Derm History:   -Acquired acanthosis nigricans of axillae    Review of Systems:  Constitutional: Reports general sense of well-being. Skin: No interval severe sunburns. Allergies: Reviewed and updated. Past Medical History, Surgical History, Medications and Allergies reviewed. Past Medical History:   Diagnosis Date    Family history of factor V deficiency     HTN (hypertension)     Migraine     Severe obesity (BMI 35.0-35.9 with comorbidity) ([facility]      History reviewed. No pertinent surgical history. 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 The following were examined and determined to be abnormal: Scalp/hair,Neck, Breast/axilla/chest, Abdomen, Back, RUE, LUE, RLE and LLE. -General: Well-appearing, NAD  1. Scattered on the trunk and extremities are multiple well-defined round and oval symmetric smoothly-bordered uniformly brown macules and papules. 2. L fronto-temporal 2cm, R occipital 1cm scalp - dome-shaped skin-colored nodules   3. R anterior neck 1 - pedunculated skin-colored soft papule     Assessment and Plan     1. Benign acquired melanocytic nevi  -Recommend monthly self skin exams   -Educated regarding the ABCDEs of melanoma detection   -Call for any new/changing moles or concerning lesions  -Reviewed sun protective behavior -- sun avoidance during the peak hours of the day, sun-protective clothing (including hat and sunglasses), sunscreen use (water resistant, broad spectrum, SPF at least 30, need for reapplication every 2 to 3 hours), avoidance of tanning beds   -Return for full skin exam in 1 year (sooner if indicated)     2. Symptomatic pilar cyst of L fronto-temporal scalp   -Edu re: benignity, excision as only definitive treatment   -Described excision, risks/benefits. Pt wishes to proceed. 3. Skin tag(s), irritated  -1 lesion(s) treated w/ liquid nitrogen x 2 cycles - neck. Edu re: risk of blister formation, discomfort, scar, hypopigmentation. Discussed wound care.

## 2021-03-02 NOTE — PATIENT INSTRUCTIONS
? Do NOT allow the site to become dry or crusted, or attempt to dry it out with rubbing alcohol or hydrogen peroxide. ? Continue this regimen until the area is pink and healed. Depending on the size and location of your cryosurgery site, healing may take 2 to 4 weeks. ? The area may continue to be pink for several weeks, and over the next few months may become darker or lighter than the surrounding skin. This may be a permanent change.

## 2021-04-05 NOTE — PROGRESS NOTES
[facility]) Dermatology  Sonido Mason MD  [phone]      Latricia Woodall  1985    30 y.o. female     Date of Visit: 4/8/2021    Chief Complaint: Cyst    History of Present Illness:  1. Pt is here for excision of a symptomatic (painful) pilar cyst on scalp.     -Pacemaker/ICD: No  -Joint prosthesis: No  -Difficulty with numbing in the past: No  -Local Anesthesia Reaction: No  -Artificial Heart Valve: No  -Organ Transplant: No  -Immunosuppression: No  -Bleeding/Clotting Disorders: No  -Anticoagulant Therapy: No  -Prior problems with wound healing: No    Review of Systems:  Constitutional: Reports general sense of well-being. Past Medical History, Medications and Allergies reviewed. Past Medical History:   Diagnosis Date    Family history of factor V deficiency     HTN (hypertension)     Migraine     Severe obesity (BMI 35.0-35.9 with comorbidity) ([facility]      History reviewed. No pertinent surgical history. No Known Allergies  Outpatient Medications Marked as Taking for the 4/8/21 encounter (Procedure visit) with Sonido Mason MD   Medication Sig Dispense Refill    Fexofenadine HCl (ALLEGRA ALLERGY PO) Take by mouth      Multiple Vitamins-Minerals (ALIVE WOMENS ENERGY PO) Take by mouth         Physical Examination     Vitals:  BP (!) 132/92 (Site: Left Upper Arm, Position: Sitting, Cuff Size: Medium Adult) Comment: pre-excision  Temp 98.6 °F (37 °C) (Infrared)     -General: Well-appearing, NAD  1. L fronto-temporal 2.2cm scalp - dome-shaped skin-colored nodule    Assessment and Plan     1. Symptomatic pilar cyst, L fronto-temporal scalp  -Informed written consent obtained after risks (bleeding, infection, scar, discomfort),  benefits explained. -Area prepped/draped in sterile fashion. Local anesthesia achieved with 1% lidocaine w/ epinephrine/sodium bicarbonate. Incision performed overlying the cyst. Cyst was visualized and dissected from surrounding tissue. Specimen submitted to pathology. Hemostasis achieved w/ electrodessication. Layered closure with 3-0 deep vicryl sutures and 4-0 running prolene sutures.   -Length of closure - 2.2cm  -Edu re: wound care, suture removal   -Post-procedure /88. Pt left office in stable condition.

## 2021-04-05 NOTE — PATIENT INSTRUCTIONS
Surgical Wound Care Instructions    Sutured Wounds:   After your surgery, go home and take it easy. Please refrain from any vigorous physical activity or heavy lifting.  Leave the pressure bandage in place for 48 hours. If it starts to detach, reinforce the bandage with another piece of tape.  Please keep the bandage dry for 48 hours.  After 48 hours, the wound can get wet. Clean the area daily using mild soapy water and a gauze pad or cotton tipped applicator, applying gentle pressure.  Apply a thin layer of Vaseline or petroleum jelly.  Cut a Telfa pad in the shape of the wound but slightly larger and secure with tape. Special Sites:   Facial sites:  o Keep the wound elevated for the first 2 nights.  o Do not sleep on the side of the body where the wound is located. o Do not bend your head lower than your heart or engage in heavy lifting.  Leg:  o Keep the leg elevated when reclining, using a pillow to elevate the extremity. Complications:   If bleeding develops when you go home, apply pressure for 15 minutes continuously. A small amount of ice in a bag covered with a towel may be used for another 10 minutes if necessary. If the bleeding does not stop, please call your dermatologist.   Please call the office if you develop any fevers, chills or pus drains from the wound.  A small amount of redness at the site of the surgery is normal at first, but if the redness begins to spread and/or pain worsens, you may have an infection and need to call the office.

## 2021-04-08 ENCOUNTER — PROCEDURE VISIT (OUTPATIENT)
Dept: DERMATOLOGY | Age: 36
End: 2021-04-08
Payer: COMMERCIAL

## 2021-04-08 VITALS — TEMPERATURE: 98.6 F | SYSTOLIC BLOOD PRESSURE: 132 MMHG | DIASTOLIC BLOOD PRESSURE: 92 MMHG

## 2021-04-08 DIAGNOSIS — R20.9 DISTURBANCE OF SKIN SENSATION: ICD-10-CM

## 2021-04-08 DIAGNOSIS — L72.11 PILAR CYST: Primary | ICD-10-CM

## 2021-04-08 PROCEDURE — 12031 INTMD RPR S/A/T/EXT 2.5 CM/<: CPT | Performed by: DERMATOLOGY

## 2021-04-08 PROCEDURE — 11423 EXC H-F-NK-SP B9+MARG 2.1-3: CPT | Performed by: DERMATOLOGY

## 2021-04-12 LAB — DERMATOLOGY PATHOLOGY REPORT: NORMAL

## 2021-04-22 ENCOUNTER — NURSE ONLY (OUTPATIENT)
Dept: DERMATOLOGY | Age: 36
End: 2021-04-22
Payer: COMMERCIAL

## 2021-04-22 VITALS — TEMPERATURE: 98.6 F

## 2021-04-22 DIAGNOSIS — L72.11 PILAR CYST: Primary | ICD-10-CM

## 2021-04-22 PROCEDURE — 99214 OFFICE O/P EST MOD 30 MIN: CPT | Performed by: DERMATOLOGY

## 2021-06-08 ENCOUNTER — VIRTUAL VISIT (OUTPATIENT)
Dept: PRIMARY CARE CLINIC | Age: 36
End: 2021-06-08
Payer: COMMERCIAL

## 2021-06-08 DIAGNOSIS — R10.2 SUPRAPUBIC PRESSURE: ICD-10-CM

## 2021-06-08 DIAGNOSIS — R35.0 URINARY FREQUENCY: Primary | ICD-10-CM

## 2021-06-08 PROCEDURE — 99213 OFFICE O/P EST LOW 20 MIN: CPT | Performed by: FAMILY MEDICINE

## 2021-06-08 RX ORDER — SULFAMETHOXAZOLE AND TRIMETHOPRIM 800; 160 MG/1; MG/1
1 TABLET ORAL 2 TIMES DAILY
Qty: 10 TABLET | Refills: 0 | Status: SHIPPED | OUTPATIENT
Start: 2021-06-08 | End: 2021-06-13

## 2021-06-08 NOTE — PROGRESS NOTES
PROGRESS NOTE     Kyle Bautista MD  [address] [facility]                                  [address] [place] [address]. [address], [address] [id number]         Phone: [phone]      Name:  Mirela Graves  :  1985  Date:  2021    ASSESSMENT/PLAN:     Urinary frequency/ Suprapubic pressure  Likely UTI. Low risk of STD. Will treat with bactrim DS x 5 days. Pt told to call or return to clinic prn if these symptoms worsen or fail to improve as anticipated. SUBJECTIVE/OBJECTIVE:    CC:  Urinary freq      HPI:     30 yo WF presenting with 5 days of urinary frequency and sensation that has to urinate even if she doesn't actually have to. Pt having some mild low back pain and intermittent suprapubic pressure. No dysuria, hematuria, pelvic or abd pain, fever, vaginal discharge. She is not having any flank pain or pain by CVA bilaterally    ROS:  See hpi      Patient-Reported Vitals 2021   Patient-Reported Weight 238   Patient-Reported Height 5'7        Outpatient Medications Marked as Taking for the 21 encounter (Virtual Visit) with Clarisa Duron MD   Medication Sig Dispense Refill    sulfamethoxazole-trimethoprim (BACTRIM DS;SEPTRA DS) 800-160 MG per tablet Take 1 tablet by mouth 2 times daily for 5 days 10 tablet 0       Physical Exam:    Constitutional:   · Reviewed vitals above  · Well Nourished, well developed, no distress       HENT:  · No trouble breathing through nose  Resp:  · Normal effort  · No visualized signs of difficulty breathing or respiratory distress  Musculoskeletal:  · Normal Gait  · Normal ROM of neck w/o pain  Skin:  · No rashes on inspection of facial skin  Psych:  · Normal mood and affect  · Normal insight and judgement              Mirela Graves  is a 29 y.o.  female being evaluated by a Virtual Visit (video visit) encounter to address concerns as mentioned above.   A caregiver was present

## 2021-11-17 ENCOUNTER — TELEPHONE (OUTPATIENT)
Dept: PRIMARY CARE CLINIC | Age: 36
End: 2021-11-17

## 2021-11-17 NOTE — TELEPHONE ENCOUNTER
PT has rash on inner thigh that is spreading. She has been trying to treat it with Aquaphor and making sure the area is clean and dry and by wearing soft clothes. PT thought it was a ingrown hair but it has been close to 2 weeks now it does not hurt or itch. PT says that the lines of the rash connects and makes a ring. They start as little red dots and then spread from there. One is the size of a quarter and the rest range from the size of an eraser to the point of a pen. No change in soaps or detergents.      [phone]

## 2021-11-18 NOTE — TELEPHONE ENCOUNTER
Put on schedule at [facility] to come up now    Please document call and then close encounter.   thanks

## 2022-04-28 ENCOUNTER — OFFICE VISIT (OUTPATIENT)
Dept: FAMILY MEDICINE CLINIC | Age: 37
End: 2022-04-28
Payer: COMMERCIAL

## 2022-04-28 VITALS
OXYGEN SATURATION: 96 % | WEIGHT: 265 LBS | HEIGHT: 67 IN | HEART RATE: 68 BPM | SYSTOLIC BLOOD PRESSURE: 135 MMHG | TEMPERATURE: 98.2 F | DIASTOLIC BLOOD PRESSURE: 85 MMHG | BODY MASS INDEX: 41.59 KG/M2

## 2022-04-28 DIAGNOSIS — R22.1 NECK FULLNESS: ICD-10-CM

## 2022-04-28 DIAGNOSIS — R03.0 PRE-HYPERTENSION: Primary | ICD-10-CM

## 2022-04-28 LAB
A/G RATIO: 1.9 (ref 1.1–2.2)
ALBUMIN SERPL-MCNC: 4.6 G/DL (ref 3.4–5)
ALP BLD-CCNC: 53 U/L (ref 40–129)
ALT SERPL-CCNC: 21 U/L (ref 10–40)
ANION GAP SERPL CALCULATED.3IONS-SCNC: 15 MMOL/L (ref 3–16)
AST SERPL-CCNC: 25 U/L (ref 15–37)
BILIRUB SERPL-MCNC: <0.2 MG/DL (ref 0–1)
BUN BLDV-MCNC: 20 MG/DL (ref 7–20)
CALCIUM SERPL-MCNC: 9.5 MG/DL (ref 8.3–10.6)
CHLORIDE BLD-SCNC: 103 MMOL/L (ref 99–110)
CO2: 22 MMOL/L (ref 21–32)
CREAT SERPL-MCNC: 0.7 MG/DL (ref 0.6–1.1)
GFR AFRICAN AMERICAN: >60
GFR NON-AFRICAN AMERICAN: >60
GLUCOSE BLD-MCNC: 83 MG/DL (ref 70–99)
POTASSIUM SERPL-SCNC: 4.1 MMOL/L (ref 3.5–5.1)
SODIUM BLD-SCNC: 140 MMOL/L (ref 136–145)
T3 FREE: 3.1 PG/ML (ref 2.3–4.2)
T4 FREE: 1.2 NG/DL (ref 0.9–1.8)
TOTAL PROTEIN: 7 G/DL (ref 6.4–8.2)
TSH REFLEX: 1.5 UIU/ML (ref 0.27–4.2)

## 2022-04-28 PROCEDURE — 99214 OFFICE O/P EST MOD 30 MIN: CPT | Performed by: FAMILY MEDICINE

## 2022-04-28 RX ORDER — ACETAMINOPHEN, ASPIRIN AND CAFFEINE 250; 250; 65 MG/1; MG/1; MG/1
1 TABLET, FILM COATED ORAL EVERY 6 HOURS PRN
COMMUNITY
End: 2022-08-04 | Stop reason: ALTCHOICE

## 2022-04-28 RX ORDER — CETIRIZINE HYDROCHLORIDE 10 MG/1
10 TABLET ORAL DAILY
COMMUNITY

## 2022-04-28 RX ORDER — SUMATRIPTAN 50 MG/1
50 TABLET, FILM COATED ORAL DAILY PRN
Qty: 9 TABLET | Refills: 0 | Status: SHIPPED | OUTPATIENT
Start: 2022-04-28 | End: 2022-06-15 | Stop reason: SDUPTHER

## 2022-04-28 NOTE — PROGRESS NOTES
A/P:    Diagnosis Orders   1. Pre-hypertension     2. BMI 40.0-44.9, adult ([address]  Hemoglobin A1C    Comprehensive Metabolic Panel    TSH with Reflex    T3, Free    T4, Free    C-Peptide    Julia Hennessy DO - Bariatric Surgery, [facility] SPECIALTY [facility]   3. Neck fullness  US THYROID     She will continue working to optimize her lifestyle, she will especially work on increasing her activity level    Check above lab work    I have placed a referral to bariatrics for her to discuss possible medication, procedural options to help with weight loss    For her neck fullness, I have ordered thyroid labs and ultrasound. Follow-up in 6 months or sooner if needed. O:   /85   Pulse 68   Temp 98.2 °F (36.8 °C)   Ht 5' 7\" (1.702 m)   Wt 265 lb (120.2 kg)   SpO2 96%   BMI 41.50 kg/m²   Gen- NAD, pleasant, affected by obesity  HEENT- Eyes without icterus or injection, throat and tms unremarkable  Neck-neck fullness appreciated, no lymphadenopathy appreciated  Lungs- CTAB  Heart- RRR  Abd- Soft, non tender  Ext- No edema  Psych- Appropriate    S: CC-establish care  HPI-patient presents to establish with new clinic provider. She reports she is doing reasonably well overall. She has rare migraines often triggered by diet. She has had difficulty losing weight throughout her life. She reports her lowest weight has been about 220 and her highest weight has been about 320. She eats a very clean diet. She has seen counselor for weight loss in the past.  Contrave caused her migraines. She denies restriction of food, binging, purging, she denies sleep apnea symptoms. She reports she is up-to-date with her Pap smear. She has an IUD.     ROS  Denies fever, chills, night sweats  Denies headaches, sore throat, ear pain  Denies chest pain, dyspnea, palpitations  Denies nausea, vomiting, diarrhea  Denies joint pain  Denies depression, anxiety  Denies rashes    Current Outpatient Medications   Medication Sig Dispense Refill    cetirizine (ZYRTEC) 10 MG tablet Take 10 mg by mouth daily      aspirin-acetaminophen-caffeine (EXCEDRIN MIGRAINE) 250-250-65 MG per tablet Take 1 tablet by mouth every 6 hours as needed for Headaches      SUMAtriptan (IMITREX) 50 MG tablet Take 1 tablet by mouth daily as needed for Migraine 9 tablet 0     No current facility-administered medications for this visit.         No Known Allergies     Past Medical History:   Diagnosis Date    Family history of factor V deficiency     HTN (hypertension)     Migraine     Preeclampsia 7/13/2014    Severe obesity (BMI 35.0-35.9 with comorbidity) ([address]     Skin abnormality     scalp cysts in past        Past Surgical History:   Procedure Laterality Date    SKIN BIOPSY          Social History     Social History Narrative    , 16 yo and 11 yo as of 2022, , likes to hike, garden, spend time with kids        Family History   Problem Relation Age of Onset    Thyroid Cancer Mother     High Cholesterol Father     Cancer Paternal Grandmother         skin, [employer] [place] [address], DO

## 2022-04-29 DIAGNOSIS — Z83.49 FAMILY HISTORY OF THYROID DISEASE: Primary | ICD-10-CM

## 2022-04-29 DIAGNOSIS — R22.1 NECK FULLNESS: ICD-10-CM

## 2022-04-29 LAB
ESTIMATED AVERAGE GLUCOSE: 102.5 MG/DL
HBA1C MFR BLD: 5.2 %
THYROID PEROXIDASE (TPO) ABS: 11 IU/ML

## 2022-05-03 LAB — C-PEPTIDE: 5.4 NG/ML (ref 1.1–4.4)

## 2022-05-06 ENCOUNTER — TELEPHONE (OUTPATIENT)
Dept: FAMILY MEDICINE CLINIC | Age: 37
End: 2022-05-06

## 2022-05-06 NOTE — TELEPHONE ENCOUNTER
States that she will double check with mom on what type on what cancer she had. Pt states that she has video visit set up with the weight management place in [facility].

## 2022-05-06 NOTE — TELEPHONE ENCOUNTER
Please let patient know her labs look very good except for her mildly elevated insulin level. This shows evidence of insulin resistance. This will increase her risk for diabetes down the road. I would recommend that she consider metformin or Wegovy. These meds should help with this and also help with weight loss. Would want to confirm that her mom did not have medullary thyroid cancer with the [facility]KRISTI AGUIRRE.   She can also discuss options with weight loss specialist.

## 2022-05-10 NOTE — TELEPHONE ENCOUNTER
Pt called in stating she was supposed to et Dr. Rosalia Blum know the type of thyroid cancer her Mother had. Pt states it was Papillary.  Pt is reachable at [phone]

## 2022-05-11 ENCOUNTER — TELEPHONE (OUTPATIENT)
Dept: FAMILY MEDICINE CLINIC | Age: 37
End: 2022-05-11

## 2022-05-11 NOTE — TELEPHONE ENCOUNTER
States was referred to [facility] weight loss center. When calling to schedule, was advised that they do not have the med that was suggested. Calling to find out what to do. Please advise.  Please call pt back at [phone]

## 2022-05-11 NOTE — TELEPHONE ENCOUNTER
Please let pt know papillary thyroid cancer has a low risk for being genetic. Would be ok with her trying Welgovy.  With her having appt with weight management, feel it is best to talk with them about med/surgical options first.

## 2022-05-12 NOTE — TELEPHONE ENCOUNTER
Please see when her appointment is, there may be some other meds that they would recommend her try first

## 2022-05-12 NOTE — TELEPHONE ENCOUNTER
Left Message for patient [phone] (home) [phone] (work)   to call the office regarding below message.

## 2022-05-16 ENCOUNTER — TELEPHONE (OUTPATIENT)
Dept: FAMILY MEDICINE CLINIC | Age: 37
End: 2022-05-16

## 2022-05-16 NOTE — TELEPHONE ENCOUNTER
States she called at weight loss center and they do not provide welgovy. They only offer 3 medications (contrave, magaly, sexanda ). She states surgical options are pushed out 6 months or more. States Dr KRSIHNA would have to prescribe medications if needed. Patient states she cannot be on the medications and also explore surgical options. Please advise.

## 2022-05-16 NOTE — TELEPHONE ENCOUNTER
----- Message from Lolly Mendoza sent at 5/16/2022 12:29 PM EDT -----  Subject: Message to Provider    QUESTIONS  Information for Provider? Patient wants to discuss her medications   ---------------------------------------------------------------------------  --------------  CALL BACK INFO  What is the best way for the office to contact you? OK to leave message on   voicemail  Preferred Call Back Phone Number? [phone]  ---------------------------------------------------------------------------  --------------  SCRIPT ANSWERS  Relationship to Patient?  Self

## 2022-06-03 NOTE — TELEPHONE ENCOUNTER
Pt is calling about the medication. States that she would like to try the metformin first before she jumps to trying an injection. However states that she is ok with either especially if dr rosas has a preference. States that she its still thinking about the surgical option but would like to see how medication works for her first.     Advised the dr rosas is BILL.

## 2022-06-09 RX ORDER — METFORMIN HYDROCHLORIDE 500 MG/1
1000 TABLET, EXTENDED RELEASE ORAL
Qty: 180 TABLET | Refills: 0 | Status: SHIPPED | OUTPATIENT
Start: 2022-06-09 | End: 2022-09-06

## 2022-06-15 RX ORDER — SUMATRIPTAN 50 MG/1
50 TABLET, FILM COATED ORAL DAILY PRN
Qty: 9 TABLET | Refills: 0 | Status: SHIPPED | OUTPATIENT
Start: 2022-06-15 | End: 2022-08-05 | Stop reason: SDUPTHER

## 2022-06-15 NOTE — TELEPHONE ENCOUNTER
Last Fill Date:  4/28/22  R:0  Last OV:4/28/22  Next OV:101/28/22   Plan Of Care:  Return in about 6 months (around 10/28/2022) for pre-htn.

## 2022-06-15 NOTE — TELEPHONE ENCOUNTER
Pt called stating she is in need of a refill for her Sumatriptan as she took her last pill this morning and she feels a migraine still coming on and thinks she needs to take a second pill.  Pt uses [facility] on [address]

## 2022-08-04 ENCOUNTER — OFFICE VISIT (OUTPATIENT)
Dept: BARIATRICS/WEIGHT MGMT | Age: 37
End: 2022-08-04
Payer: COMMERCIAL

## 2022-08-04 VITALS
BODY MASS INDEX: 42.38 KG/M2 | WEIGHT: 270 LBS | HEART RATE: 89 BPM | DIASTOLIC BLOOD PRESSURE: 89 MMHG | SYSTOLIC BLOOD PRESSURE: 138 MMHG | HEIGHT: 67 IN

## 2022-08-04 DIAGNOSIS — E66.01 MORBID OBESITY WITH BMI OF 40.0-44.9, ADULT (HCC): Primary | ICD-10-CM

## 2022-08-04 DIAGNOSIS — I10 HYPERTENSION, ESSENTIAL: ICD-10-CM

## 2022-08-04 PROCEDURE — 99204 OFFICE O/P NEW MOD 45 MIN: CPT | Performed by: SURGERY

## 2022-08-04 RX ORDER — LEVONORGESTREL 52 MG/1
1 INTRAUTERINE DEVICE INTRAUTERINE ONCE
COMMUNITY

## 2022-08-04 NOTE — PROGRESS NOTES
Yaakov Barakat is a 40 y.o. female with a date of birth of 1985. Vitals:    08/04/22 1228   BP: 138/89   Pulse: 89    BMI: Body mass index is 42.29 kg/m². Obesity Classification: Class III    Weight History: Wt Readings from Last 3 Encounters:   08/04/22 270 lb (122.5 kg)   04/28/22 265 lb (120.2 kg)   05/01/19 236 lb (107 kg)     Patient's lowest adult weight was 217 lbs at age 36. Patient's highest adult weight was 319 lbs at age 33. Patient has participated in the following weight loss programs: [facility], [facility], [facility] and MD supervised. Patient has not participated in meal replacement/liquid diets. Patient has participated in weight loss medications- contrave. Patient is not lactose intolerant. Patient does not have Buddhism/cultural food concerns. Patient does not have food allergies. Patient does tolerate artificial sweeteners. 24 hour recall/food frequency chart:  Breakfast: yes. wheat toast w/ PB  Snack: yes. mixed nuts & cheese  Lunch: yes. veggie wrap w/ turkey/cheese/lettuce, apple & chips  Snack: yes. cookies  Dinner: yes. steak w/ pasta & corn  Snack: yes. halo ice cream  Drinks throughout the day: water / sometimes caffeinated raul   Do you drink alcohol? No.    Patient does meet the criteria for binge eating disorder. Patient does have grazing. Patient does not have night eating. Patient does have a history of emotional eating or eating out of boredom. Surgery  Patient does feel confident in her ability to make these changes. The patient's expectations of post-surgical eating habits - voices understanding. Patient states she does understand the consequences of not complying with post-op food guidelines. Patient states she does understands the long term changes in food intake that will be necessary for all occasions after surgery for the rest of her life. Patient is deemed nutritionally appropriate to proceed.     Goals  Weight: 180 lb (ultimate) just wants to feel better  Health Improvement: prevent HTN / insulin resistance / back pain / qol     Assessment  Nutritional Needs: RMR=(9.99 x 123) + (6.25 x 170) - (4.92 x 37 y.o.) -161 = 1954 kcal x 1.3 (sedentary activity factor)= 2540 kcal - 1000 (for 2 lb weight loss/week)= 1540 kcal.  *meditation w/ stretching 5x/wk    Plan  Plan/Recommendations: Start presurgical guidelines. Goals:   -Eat 4-5 times daily  -Avoid high fat and high sugar foods  -Include protein with all meals and snacks  -Avoid carbonation and caffeine  -Avoid calorie containing beverages  -Increase physical activity as tolerated    PES Statement:  Overweight/Obesity related to lack of exercise, sedentary lifestyle, unhealthy eating habits, and unsuccessful diet attempts as evidenced by BMI. Body mass index is 42.29 kg/m². Will follow up as necessary.     Thais Griffith RD, LD

## 2022-08-05 RX ORDER — SUMATRIPTAN 50 MG/1
50 TABLET, FILM COATED ORAL DAILY PRN
Qty: 9 TABLET | Refills: 0 | Status: SHIPPED | OUTPATIENT
Start: 2022-08-05 | End: 2022-08-11 | Stop reason: SDUPTHER

## 2022-08-05 NOTE — TELEPHONE ENCOUNTER
Medication:   Requested Prescriptions     Pending Prescriptions Disp Refills    SUMAtriptan (IMITREX) 50 MG tablet 9 tablet 0     Sig: Take 1 tablet by mouth daily as needed for Migraine       Last Filled:      Patient Phone Number: [phone] (home) [phone] (work)    Last appt: 4/28/2022   Next appt: 10/28/2022

## 2022-08-08 RX ORDER — SUMATRIPTAN 50 MG/1
50 TABLET, FILM COATED ORAL DAILY PRN
Qty: 9 TABLET | Refills: 0 | OUTPATIENT
Start: 2022-08-08

## 2022-08-11 ENCOUNTER — PATIENT MESSAGE (OUTPATIENT)
Dept: FAMILY MEDICINE CLINIC | Age: 37
End: 2022-08-11

## 2022-08-11 RX ORDER — SUMATRIPTAN 50 MG/1
50 TABLET, FILM COATED ORAL DAILY PRN
Qty: 9 TABLET | Refills: 11 | Status: SHIPPED | OUTPATIENT
Start: 2022-08-11 | End: 2022-09-12 | Stop reason: SDUPTHER

## 2022-08-11 NOTE — TELEPHONE ENCOUNTER
From: Ministerio Bautista  To: Dr. Peña Ibarra  Sent: 8/11/2022 7:10 AM EDT  Subject: [facility] refill    Ignacia Weir out of [facility] and they asked to refill the prescription to have it on hand in case of a migraine. Last time a migraine came on I only had one left, but needed a second dose and didnt have it because your office was closed on weekends.     Thanks,  Cortney Tijerina

## 2022-09-01 NOTE — PROGRESS NOTES
[facility]) Physicians   General & Laparoscopic Surgery  [facility]      HPI:    Karolina Franco is a very pleasant 40 y.o. obese female ,   Body mass index is 42.29 kg/m². And multiple medical problems who is presenting for weight loss surgery evaluation and consultation by Dr. Kyrie Villalba. Patient has been struggling for several years now with obesity. Patient feels the weight is an obstacle to achieve and perform things in daily living as well risk on health. Tries to diet, and exercise but can't keep the weight off. Patient tried other regimens, but with no sustainable weight loss. Patient  is very determined to lose weight and be healthy, and is interested in surgical weight loss to achieve this goal.    Otherwise patient denies any nausea, vomiting, fevers, chills, shortness of breath, chest pain, constipation or urinary symptoms.       Morbid obesity and co-morbidities related to it are a threat to bodily function    Past Medical History:   Diagnosis Date    Back pain     Family history of factor V deficiency     HTN (hypertension)     Hypertension, essential     Migraine     Morbid obesity with BMI of 40.0-44.9, adult (HCC)     Pre-operative clearance     Preeclampsia 07/13/2014    Severe obesity (BMI 35.0-35.9 with comorbidity) (HCC)     Skin abnormality     scalp cysts in past     Past Surgical History:   Procedure Laterality Date    SKIN BIOPSY       Family History   Problem Relation Age of Onset    Cancer Mother         thyroid    Diabetes Mother     Thyroid Cancer Mother     Seizures Mother     Alcohol Abuse Father     Elevated Lipids Father     Hypertension Father     High Cholesterol Father     Seizures Sister     Cancer Paternal Grandmother         skin, NMSC     Social History     Tobacco Use    Smoking status: Never    Smokeless tobacco: Never   Substance Use Topics    Alcohol use: Yes     Comment: once monthly     I counseled the patient on the importance of not smoking and risks of ETOH. No Known Allergies  Vitals:    08/04/22 1228   BP: 138/89   Pulse: 89   Weight: 270 lb (122.5 kg)   Height: 5' 7\" (1.702 m)       Body mass index is 42.29 kg/m². Current Outpatient Medications:     levonorgestrel (MIRENA, 52 MG,) IUD 52 mg, 1 each by IntraUTERine route once, Disp: , Rfl:     metFORMIN (GLUCOPHAGE-XR) 500 mg extended release tablet, Take 2 tablets by mouth daily (with breakfast) For first week, take 1 tablet daily, Disp: 180 tablet, Rfl: 0    cetirizine (ZYRTEC) 10 MG tablet, Take 10 mg by mouth daily, Disp: , Rfl:     SUMAtriptan (IMITREX) 50 MG tablet, Take 1 tablet by mouth daily as needed for Migraine, Disp: 9 tablet, Rfl: 11      Review of Systems - History obtained from the patient  General ROS: negative  Psychological ROS: negative  Ophthalmic ROS: negative  Neurological ROS: negative  ENT ROS: negative  Allergy and Immunology ROS: negative  Hematological and Lymphatic ROS: negative  Endocrine ROS: negative  Respiratory ROS: negative  Cardiovascular ROS: negative  Gastrointestinal ROS:negative  Genito-Urinary ROS: negative  Musculoskeletal ROS: negative   Skin ROS: negative    Physical Exam   Constitutional: Patient is oriented to person, place, and time. Vital signs are normal. Patient  appears well-developed and well-nourished. Patient  is active and cooperative. Non-toxic appearance. No distress. HENT:   Head: Normocephalic and atraumatic. Head is without laceration. Right Ear: External ear normal. No lacerations. No drainage, swelling or tenderness. Left Ear: External ear normal. No lacerations. No drainage, swelling or tenderness. Nose: Nose normal. No nose lacerations or nasal deformity. Mouth/Throat: Uvula is midline, oropharynx is clear and moist and mucous membranes are normal. No oropharyngeal exudate. Eyes: Conjunctivae, EOM and lids are normal. Pupils are equal, round, and reactive to light. Right eye exhibits no discharge.  No foreign body present in the right eye. Left eye exhibits no discharge. No foreign body present in the left eye. No scleral icterus. Neck: Trachea normal and normal range of motion. Neck supple. No JVD present. No tracheal tenderness present. Carotid bruit is not present. No rigidity. No tracheal deviation and no edema present. No thyromegaly present. Cardiovascular: Normal rate, regular rhythm, normal heart sounds, intact distal pulses and normal pulses. Pulmonary/Chest: Effort normal and breath sounds normal. No stridor. No respiratory distress. Patient  has no wheezes. Patient has no rales. Patient exhibits no tenderness and no crepitus. Abdominal: Soft. Normal appearance and bowel sounds are normal. Patient exhibits no distension, no abdominal bruit, no ascites and no mass. There is no hepatosplenomegaly. There is no tenderness. There is no rigidity, no rebound, no guarding and no CVA tenderness. No hernia. Hernia confirmed negative in the ventral area. Musculoskeletal: Normal range of motion. Patient exhibits no edema or tenderness. Lymphadenopathy:        Head (right side): No submental, no submandibular, no preauricular, no posterior auricular and no occipital adenopathy present. Head (left side): No submental, no submandibular, no preauricular, no posterior auricular and no occipital adenopathy present. Patient  has no cervical adenopathy. Right: No supraclavicular adenopathy present. Left: No supraclavicular adenopathy present. Neurological: Patient is alert and oriented to person, place, and time. Patient has normal strength. Coordination and gait normal. GCS eye subscore is 4. GCS verbal subscore is 5. GCS motor subscore is 6. Skin: Skin is warm and dry. No abrasion and no rash noted. Patient  is not diaphoretic. No cyanosis or erythema. Psychiatric: Patient has a normal mood and affect.   speech is normal and behavior is normal. Cognition and memory are normal. Angelo Ye is a very pleasant 40 y.o. female with Obesity,  Body mass index is 42.29 kg/m². and multiple obesity related co-morbidities. Adrianna Root is very motivated to lose weight and being more healthy. We discussed how her weight affects her overall health including:  Micah Moe was seen today for bariatric, initial visit. Diagnoses and all orders for this visit:    Morbid obesity with BMI of 40.0-44.9, adult ([facility] [address]  -     CBC with Auto Differential; Future  -     Comprehensive Metabolic Panel; Future  -     Hemoglobin A1C; Future  -     Iron and TIBC; Future  -     Lipid Panel; Future  -     TSH with Reflex; Future  -     Vitamin B12 & Folate; Future  -     Vitamin D 25 Hydroxy; Future  -     CBC with Auto Differential; Future  -     Comprehensive Metabolic Panel; Future  -     Iron and TIBC; Future  -     Hemoglobin A1C; Future  -     Lipid Panel; Future  -     TSH with Reflex; Future  -     Vitamin B12 & Folate; Future  -     Vitamin D 25 Hydroxy; Future    Hypertension, essential  -     CBC with Auto Differential; Future  -     Comprehensive Metabolic Panel; Future  -     Hemoglobin A1C; Future  -     Iron and TIBC; Future  -     Lipid Panel; Future  -     TSH with Reflex; Future  -     Vitamin B12 & Folate; Future  -     Vitamin D 25 Hydroxy; Future  -     CBC with Auto Differential; Future  -     Comprehensive Metabolic Panel; Future  -     Iron and TIBC; Future  -     Hemoglobin A1C; Future  -     Lipid Panel; Future  -     TSH with Reflex; Future  -     Vitamin B12 & Folate; Future  -     Vitamin D 25 Hydroxy; Future      The patient underwent 30 minutes of dietary counseling. I have reviewed, discussed and agree with the dietary plan. Medical weight loss and different surgical options were discussed in details with patient. Adrianna Root is interested in surgical weight loss.   Patient is interested in Laparoscopic Sleeve Gastrectomy, which I believe is an excellent option for the patient. We will proceed with pre-operative work up labs and studies. Will also petition patient's  insurance for approval for this procedure. Patient received dietary handouts and education. Patient advised that its their responsibility to follow up for studies and/or labs ordered today. Also discussed in details the importance of follow up, as well following the recommendations and completing the whole program to improve outcomes when it comes to healthier lifestyle as well weight loss. Patient also advised about risks and benefits being on a strict dietary regimen as well using supplements. Patient agrees and wants to proceed with weight loss planning     Labs ordered. Psych Evaluation. H-pylori   EGD  Support Group. PCP Letter. Weight History    F/U in 4 weeks. Patient advised that its their responsibility to follow up for studies and/or labs ordered today.

## 2022-09-06 ENCOUNTER — TELEPHONE (OUTPATIENT)
Dept: FAMILY MEDICINE CLINIC | Age: 37
End: 2022-09-06

## 2022-09-06 RX ORDER — METFORMIN HYDROCHLORIDE 500 MG/1
TABLET, EXTENDED RELEASE ORAL
Qty: 180 TABLET | Refills: 0 | Status: SHIPPED | OUTPATIENT
Start: 2022-09-06 | End: 2022-10-28 | Stop reason: SDUPTHER

## 2022-09-06 NOTE — TELEPHONE ENCOUNTER
Received script for metformin. Has question RE: script.  Please call [facility] back at [phone]

## 2022-09-13 RX ORDER — SUMATRIPTAN 50 MG/1
50 TABLET, FILM COATED ORAL DAILY PRN
Qty: 9 TABLET | Refills: 11 | Status: SHIPPED | OUTPATIENT
Start: 2022-09-13

## 2022-09-27 RX ORDER — SUMATRIPTAN 50 MG/1
50 TABLET, FILM COATED ORAL DAILY PRN
Qty: 9 TABLET | Refills: 11 | OUTPATIENT
Start: 2022-09-27

## 2022-09-27 NOTE — TELEPHONE ENCOUNTER
Last filled 9/13/22   Disp Refills Start End    SUMAtriptan (IMITREX) 50 MG tablet 9 tablet 11 9/13/2022     Sig - Route:  Take 1 tablet by mouth daily as needed for Migraine - Oral    Sent to pharmacy as: SUMAtriptan Succinate 50 MG Oral Tablet (Imitrex)    E-Prescribing Status: Receipt confirmed by pharmacy (9/13/2022 12:56 PM EDT)

## 2022-09-30 ENCOUNTER — OFFICE VISIT (OUTPATIENT)
Dept: BARIATRICS/WEIGHT MGMT | Age: 37
End: 2022-09-30

## 2022-09-30 VITALS
SYSTOLIC BLOOD PRESSURE: 136 MMHG | DIASTOLIC BLOOD PRESSURE: 85 MMHG | WEIGHT: 271 LBS | BODY MASS INDEX: 42.53 KG/M2 | HEIGHT: 67 IN

## 2022-09-30 DIAGNOSIS — Z01.818 PREOPERATIVE CLEARANCE: ICD-10-CM

## 2022-09-30 DIAGNOSIS — E66.01 MORBID OBESITY WITH BMI OF 40.0-44.9, ADULT (HCC): Primary | ICD-10-CM

## 2022-09-30 PROCEDURE — 99999 PR OFFICE/OUTPT VISIT,PROCEDURE ONLY: CPT | Performed by: FAMILY MEDICINE

## 2022-09-30 NOTE — PROGRESS NOTES
Melinda De Santiago is a 40 y.o. female with a date of birth of 1985. Vitals:    09/30/22 0905   BP: 136/85   Site: Left Upper Arm   Weight: 271 lb (122.9 kg)   Height: 5' 7\" (1.702 m)    BMI: Body mass index is 42.44 kg/m². Obesity Classification: Class III    Weight History: Wt Readings from Last 3 Encounters:   09/30/22 271 lb (122.9 kg)   08/04/22 270 lb (122.5 kg)   04/28/22 265 lb (120.2 kg)       Patient's lowest adult weight was 217 lb at age 30. Patient's highest adult weight was 319 lb at age 33. Patient has participated in the following weight loss programs: Weight Watcher [employer], calorie restriction, low carb diet and physician supervised diet. Patient has not participated in meal replacement/liquid diets. Patient has participated in weight loss medications. Patient is not lactose intolerant. Patient does not have Lutheran/cultural food concerns. Patient does not have food allergies. 24 hour recall/food frequency chart:  Pt reports that she has PCOS and it has been difficult to lose weight recently. Pt was most successful in past with a low carb diet. Breakfast: 1 piece of wheat toast with organic chunky 2 TBSP PB or protein shake   Snack: mixed nuts and cheese  Lunch: 12:00-12:30 pm. veggie wrap with turkey, cheese, lettuce, with 1 apple and chips  Snack: cookies  Dinner: 6:00 pm.  4-6 oz. steak with 1 cup of pasta and 2/3 cup of corn or 2/3 cup of broccoli  Snack: 1 pint of Halo ice cream  or cookies     Drinks throughout the day: water-64 oz/day, mios     Do you drink alcohol? no.     Patient does not meet the criteria for binge eating disorder. Patient does not have grazing. Patient does not have night eating. Patient does not have a history of emotional eating or eating out of boredom. Goals  Weight: 180 lbs.   Health Improvement: Feeling better overall, less back pain, wants to be more mobile/physically active     Assessment  Nutritional Needs:RMR=(9.99 x 122.9 kg) + (6.25 x 170 cm) - (4.92 x 37 y.o.) -161  = 1951 kcal x 1.4 (light activity factor)= 2731 kcal - 1000 (for 2 lb weight loss/week)= 1731 kcal.    Plan  Start 1200 Kcal LC meal plan  Plan/Recommendations: General weight loss/lifestyle modification strategies discussed (elicit support from others; identify saboteurs; non-food rewards, etc). Diet interventions: low calorie (1000 kCal/d) deficit diet. Regular aerobic exercise program discussed. Optifast:  Pt interested in   Diet Medications:  Pt interested in     PES Statement:  Overweight/Obesity related to increased caloric intake and decreased physical activity as evidenced by BMI. Body mass index is 42.44 kg/m². Will follow up as necessary.     Karli Prater RD, LD

## 2022-10-28 RX ORDER — METFORMIN HYDROCHLORIDE 500 MG/1
TABLET, EXTENDED RELEASE ORAL
Qty: 180 TABLET | Refills: 3 | Status: SHIPPED | OUTPATIENT
Start: 2022-10-28

## 2022-10-30 PROBLEM — Z01.818 PREOPERATIVE CLEARANCE: Status: RESOLVED | Noted: 2022-09-30 | Resolved: 2022-10-30

## 2022-11-04 ENCOUNTER — TELEPHONE (OUTPATIENT)
Dept: ADMINISTRATIVE | Age: 37
End: 2022-11-04

## 2022-11-04 ENCOUNTER — OFFICE VISIT (OUTPATIENT)
Dept: FAMILY MEDICINE CLINIC | Age: 37
End: 2022-11-04
Payer: COMMERCIAL

## 2022-11-04 VITALS
WEIGHT: 274 LBS | SYSTOLIC BLOOD PRESSURE: 135 MMHG | HEART RATE: 73 BPM | BODY MASS INDEX: 43 KG/M2 | OXYGEN SATURATION: 98 % | HEIGHT: 67 IN | DIASTOLIC BLOOD PRESSURE: 80 MMHG

## 2022-11-04 DIAGNOSIS — R03.0 PRE-HYPERTENSION: ICD-10-CM

## 2022-11-04 DIAGNOSIS — E88.81 INSULIN RESISTANCE: Primary | ICD-10-CM

## 2022-11-04 PROCEDURE — 3074F SYST BP LT 130 MM HG: CPT | Performed by: FAMILY MEDICINE

## 2022-11-04 PROCEDURE — 90471 IMMUNIZATION ADMIN: CPT | Performed by: FAMILY MEDICINE

## 2022-11-04 PROCEDURE — 99214 OFFICE O/P EST MOD 30 MIN: CPT | Performed by: FAMILY MEDICINE

## 2022-11-04 PROCEDURE — 90674 CCIIV4 VAC NO PRSV 0.5 ML IM: CPT | Performed by: FAMILY MEDICINE

## 2022-11-04 PROCEDURE — 3078F DIAST BP <80 MM HG: CPT | Performed by: FAMILY MEDICINE

## 2022-11-04 SDOH — ECONOMIC STABILITY: FOOD INSECURITY: WITHIN THE PAST 12 MONTHS, THE FOOD YOU BOUGHT JUST DIDN'T LAST AND YOU DIDN'T HAVE MONEY TO GET MORE.: NEVER TRUE

## 2022-11-04 SDOH — ECONOMIC STABILITY: FOOD INSECURITY: WITHIN THE PAST 12 MONTHS, YOU WORRIED THAT YOUR FOOD WOULD RUN OUT BEFORE YOU GOT MONEY TO BUY MORE.: NEVER TRUE

## 2022-11-04 ASSESSMENT — PATIENT HEALTH QUESTIONNAIRE - PHQ9
2. FEELING DOWN, DEPRESSED OR HOPELESS: 0
SUM OF ALL RESPONSES TO PHQ QUESTIONS 1-9: 0
SUM OF ALL RESPONSES TO PHQ QUESTIONS 1-9: 0
SUM OF ALL RESPONSES TO PHQ9 QUESTIONS 1 & 2: 0
SUM OF ALL RESPONSES TO PHQ QUESTIONS 1-9: 0
1. LITTLE INTEREST OR PLEASURE IN DOING THINGS: 0
SUM OF ALL RESPONSES TO PHQ QUESTIONS 1-9: 0

## 2022-11-04 ASSESSMENT — SOCIAL DETERMINANTS OF HEALTH (SDOH): HOW HARD IS IT FOR YOU TO PAY FOR THE VERY BASICS LIKE FOOD, HOUSING, MEDICAL CARE, AND HEATING?: NOT HARD AT ALL

## 2022-11-04 NOTE — PROGRESS NOTES
A/P:    Diagnosis Orders   1. Insulin resistance        2. BMI 40.0-44.9, adult ([facility] [address]        3. Pre-hypertension          She wishes to try liraglutide as a trial for weight loss, she will continue with strategies she has developed with dietitian, she is to update me on her weight progress in about 2 weeks    Over 25 minutes was spent in patient care including chart review, face to face evaluation, coordination of care        O: /80   Pulse 73   Ht 5' 7\" (1.702 m)   Wt 274 lb (124.3 kg)   SpO2 98%   BMI 42.91 kg/m²    Gen- NAD, pleasant  HEENT- Eyes without icterus or injection  Neck- Supple, no lymphadenopathy appreciated  Lungs- CTAB  Heart- RRR  Abd- Soft, non tender  Ext- No edema  Psych- Appropriate    S: CC-check up  HPI-patient presents for follow-up of pre-hypertension, insulin resistance, obesity. She has seen weight loss clinic with [place]. She would like to hold off on surgery for now. She has gone through dietitian education.     ROS- Per HPI    Patient's medications, allergies, and past medical hx were reviewed

## 2022-11-09 NOTE — TELEPHONE ENCOUNTER
Received DENIAL:  LETTER ATTACHED. If this requires a response please respond to the pool. [address]). Please advise patient thank you.

## 2022-11-10 ENCOUNTER — TELEPHONE (OUTPATIENT)
Dept: FAMILY MEDICINE CLINIC | Age: 37
End: 2022-11-10

## 2022-11-10 NOTE — TELEPHONE ENCOUNTER
Pt called stating she picked up her saxenda but it does not come with needles and Pt said Pharmacist stated she needs a script called in for the needles as well.  Pt uses [facility] in [address] [place] [address]

## 2022-11-23 ENCOUNTER — TELEPHONE (OUTPATIENT)
Dept: ADMINISTRATIVE | Age: 37
End: 2022-11-23

## 2022-11-28 ENCOUNTER — TELEPHONE (OUTPATIENT)
Dept: FAMILY MEDICINE CLINIC | Age: 37
End: 2022-11-28

## 2022-11-28 NOTE — TELEPHONE ENCOUNTER
This was filled by Dr Dez Rivera 11/21 see below    liraglutide-weight management 18 MG/3ML SOPN 6 mL 0 11/21/2022     Sig - Route: Inject 1.2 mg into the skin daily - SubCUTAneous    Sent to pharmacy as: Liraglutide -Weight Management 18 MG/3ML Subcutaneous Solution Pen-injector    E-Prescribing Status: Receipt confirmed by pharmacy (11/21/2022  9:46 AM EST)      Pharmacy    [facility] [id number] - [place], [address] [phone] - f [phone]

## 2022-11-28 NOTE — TELEPHONE ENCOUNTER
Pt called in asking about her script being called into Pharmacy for the [address] [facility]. Pt states she put in a message through [employer] back on 11/17 and has gotten no response. Pt states it is not covered by her insurance but she will pay out of pocket for it. Pt is almost out of the medication and needs it asap.  Pt is reachable at [phone]

## 2022-11-29 NOTE — TELEPHONE ENCOUNTER
Called and spoke with patient, states her dose in supposed to increase weekly and that is what she is doing. She is currently taking 1.8 and will go to 2.4 on Saturday. Patient picked up prescription already and is good for a few more weeks. Patient is requesting 5 pens per prescription due to getting a steeper discount since she is self pay.

## 2022-11-30 NOTE — TELEPHONE ENCOUNTER
DENIED FOR PLAN EXCLUSION. LETTER ATTACHED. If this requires a response please respond to the pool. [address]). Please advise patient thank you.

## 2022-11-30 NOTE — TELEPHONE ENCOUNTER
Spoke with patient and she states its should be 1.8mg this week, and next week its the 2.4mg then starting Dec 10 it should be 3mg and that is the dosage from here on out. Patient states she can pay out of pocket and she is able to us her [employer] card.

## 2022-12-01 NOTE — TELEPHONE ENCOUNTER
Please review med instructions with patient to make sure we are on the same page first, med will not go through as e-prescribe, so we will need to call medicine in, please be sure pharmacy is able to give her 5 pens with this prescription

## 2022-12-13 NOTE — TELEPHONE ENCOUNTER
Pt states she is due for a refill. She is paying out of pocket. Wanting to know if you will call in full box , this way she can get a bigger discount on med since she is paying out of pocket. Please advise.

## 2022-12-13 NOTE — TELEPHONE ENCOUNTER
Medication:   Requested Prescriptions     Pending Prescriptions Disp Refills    liraglutide-weight management 18 MG/3ML SOPN 5 Adjustable Dose Pre-filled Pen Syringe 0     Sig: Inject 1.2 mg into the skin daily       Last Filled:      Patient Phone Number: [phone] (home)     Last appt: 11/4/2022   Next appt: Visit date not found

## 2023-01-16 RX ORDER — PEN NEEDLE, DIABETIC 31 G X1/4"
1 NEEDLE, DISPOSABLE MISCELLANEOUS DAILY
Qty: 100 EACH | Refills: 3 | Status: SHIPPED | OUTPATIENT
Start: 2023-01-16

## 2023-01-16 NOTE — TELEPHONE ENCOUNTER
Medication:   Requested Prescriptions     Pending Prescriptions Disp Refills    Insulin Pen Needle ([employer] PEN NEEDLES) 31G X 6 MM MISC 100 each 3     Si each by Does not apply route daily       Last Filled:      Patient Phone Number: [phone] (home)     Last appt: 2022   Next appt: Visit date not found

## 2023-01-18 ENCOUNTER — TELEPHONE (OUTPATIENT)
Dept: FAMILY MEDICINE CLINIC | Age: 38
End: 2023-01-18

## 2023-01-25 NOTE — TELEPHONE ENCOUNTER
DENIED BASED ON PLAN EXCLUSION. LETTER ATTACHED. If this requires a response please respond to the pool. [address]). Please advise patient thank you.

## 2023-01-27 NOTE — TELEPHONE ENCOUNTER
Please let patient know we received another PA notice that medication was denied, if she continuing to purchase med out-of-pocket?

## 2023-03-06 RX ORDER — SUMATRIPTAN 50 MG/1
50 TABLET, FILM COATED ORAL DAILY PRN
Qty: 9 TABLET | Refills: 11 | Status: SHIPPED | OUTPATIENT
Start: 2023-03-06

## 2023-03-06 NOTE — TELEPHONE ENCOUNTER
Medication:   Requested Prescriptions     Pending Prescriptions Disp Refills    SUMAtriptan (IMITREX) 50 MG tablet 9 tablet 11     Sig: Take 1 tablet by mouth daily as needed for Migraine        Last Filled:  9/13/2022    Patient Phone Number: [phone] (home)     Last appt: 11/4/2022   Next appt: Visit date not found    Last OARRS: No flowsheet data found.

## 2023-03-24 ENCOUNTER — OFFICE VISIT (OUTPATIENT)
Dept: FAMILY MEDICINE CLINIC | Age: 38
End: 2023-03-24
Payer: COMMERCIAL

## 2023-03-24 VITALS
BODY MASS INDEX: 41.75 KG/M2 | OXYGEN SATURATION: 98 % | SYSTOLIC BLOOD PRESSURE: 120 MMHG | DIASTOLIC BLOOD PRESSURE: 87 MMHG | WEIGHT: 266 LBS | HEART RATE: 79 BPM | HEIGHT: 67 IN

## 2023-03-24 DIAGNOSIS — Z00.00 PREVENTATIVE HEALTH CARE: Primary | ICD-10-CM

## 2023-03-24 DIAGNOSIS — E88.81 INSULIN RESISTANCE: ICD-10-CM

## 2023-03-24 DIAGNOSIS — G43.709 CHRONIC MIGRAINE WITHOUT AURA WITHOUT STATUS MIGRAINOSUS, NOT INTRACTABLE: ICD-10-CM

## 2023-03-24 LAB
ALBUMIN SERPL-MCNC: 4.6 G/DL (ref 3.4–5)
ALBUMIN/GLOB SERPL: 1.8 {RATIO} (ref 1.1–2.2)
ALP SERPL-CCNC: 53 U/L (ref 40–129)
ALT SERPL-CCNC: 21 U/L (ref 10–40)
ANION GAP SERPL CALCULATED.3IONS-SCNC: 13 MMOL/L (ref 3–16)
AST SERPL-CCNC: 22 U/L (ref 15–37)
BASOPHILS # BLD: 0.1 K/UL (ref 0–0.2)
BASOPHILS NFR BLD: 0.7 %
BILIRUB SERPL-MCNC: 0.3 MG/DL (ref 0–1)
BUN SERPL-MCNC: 10 MG/DL (ref 7–20)
CALCIUM SERPL-MCNC: 9.2 MG/DL (ref 8.3–10.6)
CHLORIDE SERPL-SCNC: 104 MMOL/L (ref 99–110)
CHOLEST SERPL-MCNC: 155 MG/DL (ref 0–199)
CO2 SERPL-SCNC: 21 MMOL/L (ref 21–32)
CREAT SERPL-MCNC: 0.6 MG/DL (ref 0.6–1.1)
DEPRECATED RDW RBC AUTO: 13.8 % (ref 12.4–15.4)
EOSINOPHIL # BLD: 0.1 K/UL (ref 0–0.6)
EOSINOPHIL NFR BLD: 1.3 %
FOLATE SERPL-MCNC: 15.06 NG/ML (ref 4.78–24.2)
GFR SERPLBLD CREATININE-BSD FMLA CKD-EPI: >60 ML/MIN/{1.73_M2}
GLUCOSE SERPL-MCNC: 99 MG/DL (ref 70–99)
HCT VFR BLD AUTO: 43.3 % (ref 36–48)
HDLC SERPL-MCNC: 48 MG/DL (ref 40–60)
HGB BLD-MCNC: 14.3 G/DL (ref 12–16)
INSULIN COMMENT: NORMAL
LDLC SERPL CALC-MCNC: 87 MG/DL
LYMPHOCYTES # BLD: 2.2 K/UL (ref 1–5.1)
LYMPHOCYTES NFR BLD: 29.6 %
MAGNESIUM SERPL-MCNC: 2.2 MG/DL (ref 1.8–2.4)
MCH RBC QN AUTO: 28.3 PG (ref 26–34)
MCHC RBC AUTO-ENTMCNC: 33.1 G/DL (ref 31–36)
MCV RBC AUTO: 85.5 FL (ref 80–100)
MONOCYTES # BLD: 0.5 K/UL (ref 0–1.3)
MONOCYTES NFR BLD: 6.3 %
NEUTROPHILS # BLD: 4.5 K/UL (ref 1.7–7.7)
NEUTROPHILS NFR BLD: 62.1 %
PLATELET # BLD AUTO: 227 K/UL (ref 135–450)
PMV BLD AUTO: 8 FL (ref 5–10.5)
POTASSIUM SERPL-SCNC: 4.4 MMOL/L (ref 3.5–5.1)
PROT SERPL-MCNC: 7.1 G/DL (ref 6.4–8.2)
RBC # BLD AUTO: 5.07 M/UL (ref 4–5.2)
REASON FOR REJECTION: NORMAL
REJECTED TEST: NORMAL
SODIUM SERPL-SCNC: 138 MMOL/L (ref 136–145)
T4 FREE SERPL-MCNC: 1.1 NG/DL (ref 0.9–1.8)
TRIGL SERPL-MCNC: 99 MG/DL (ref 0–150)
TSH SERPL DL<=0.005 MIU/L-ACNC: 1.86 UIU/ML (ref 0.27–4.2)
VIT B12 SERPL-MCNC: 499 PG/ML (ref 211–911)
VLDLC SERPL CALC-MCNC: 20 MG/DL
WBC # BLD AUTO: 7.3 K/UL (ref 4–11)

## 2023-03-24 PROCEDURE — 3074F SYST BP LT 130 MM HG: CPT | Performed by: FAMILY MEDICINE

## 2023-03-24 PROCEDURE — 90715 TDAP VACCINE 7 YRS/> IM: CPT | Performed by: FAMILY MEDICINE

## 2023-03-24 PROCEDURE — 36415 COLL VENOUS BLD VENIPUNCTURE: CPT | Performed by: FAMILY MEDICINE

## 2023-03-24 PROCEDURE — 90471 IMMUNIZATION ADMIN: CPT | Performed by: FAMILY MEDICINE

## 2023-03-24 PROCEDURE — 99395 PREV VISIT EST AGE 18-39: CPT | Performed by: FAMILY MEDICINE

## 2023-03-24 PROCEDURE — 3079F DIAST BP 80-89 MM HG: CPT | Performed by: FAMILY MEDICINE

## 2023-03-24 ASSESSMENT — PATIENT HEALTH QUESTIONNAIRE - PHQ9
SUM OF ALL RESPONSES TO PHQ QUESTIONS 1-9: 0
2. FEELING DOWN, DEPRESSED OR HOPELESS: 0
SUM OF ALL RESPONSES TO PHQ9 QUESTIONS 1 & 2: 0
1. LITTLE INTEREST OR PLEASURE IN DOING THINGS: 0
SUM OF ALL RESPONSES TO PHQ QUESTIONS 1-9: 0

## 2023-03-24 NOTE — PROGRESS NOTES
A/P:    Diagnosis Orders   1. Preventative health care  LIPID PANEL      2. Chronic migraine without aura without status migrainosus, not intractable  Comprehensive Metabolic Panel    Magnesium    Vitamin B12 & Folate    CBC with Auto Differential    VITAMIN B2      3. BMI 40.0-44.9, adult (HCC)  TSH    T4, Free    Insulin, total      4. Insulin resistance  Hemoglobin A1C          Healthy living encouraged, anticipatory guidance provided    For her migraines, trial of Ubrelvy prescribed, if this med is helpful, preventative Rosy Garcia should be considered    For her insulin resistance, check insulin level and A1c, she is fasting today    For her elevated blood pressure, she is to check her BP daily for the next 10 days and message me or call me with those readings    Tdap vaccine today    Follow-up in 6 months or sooner if needed    O: /87   Pulse 79   Ht 5' 7\" (1.702 m)   Wt 266 lb (120.7 kg)   LMP  (LMP Unknown)   SpO2 98%   BMI 41.66 kg/m²     Gen- NAD, pleasant  HEENT- Eyes without icterus or injection, throat and tms unremarkable  Neck- Supple, no lymphadenopathy appreciated  Lungs- CTAB  Heart- RRR  Abd- Soft, non tender  Ext- No edema  Psych- Appropriate    S: CC-physical  HPI-Nikki presents for physical.  She reports that she is doing reasonably well overall. She stopped Saxenda due to its losing efficacy. She is seeing chiropractor for some left-sided shoulder and neck pain. Her symptoms are improving. She is still getting 6-8 migraines per month often on the left side. She reports that Imitrex helps her quite a bit. She would like to get comprehensive lab work today. She we will be scheduling follow-up appointment with her gynecologist.  She has declined follow-up with bariatrics.     ROS  Denies fever, chills, night sweats  Denies sore throat, ear pain  Denies chest pain, dyspnea, palpitations  Denies nausea, vomiting, diarrhea  Denies depression, anxiety  Denies urinary

## 2023-03-25 LAB
EST. AVERAGE GLUCOSE BLD GHB EST-MCNC: 99.7 MG/DL
HBA1C MFR BLD: 5.1 %

## 2023-03-27 ENCOUNTER — TELEPHONE (OUTPATIENT)
Dept: FAMILY MEDICINE CLINIC | Age: 38
End: 2023-03-27

## 2023-03-27 NOTE — TELEPHONE ENCOUNTER
YAMILE-- rejected lab: calling to let you know that vitamin b 2 lab test was rejected because it was collected in the wrong tube, and it needs to be protected from light

## 2023-03-27 NOTE — TELEPHONE ENCOUNTER
Called and spoke with patient, explained below. Instructed patient to go to [facility] for labs. (B 2- needs green tube) Patient is asking if this is something that is urgent and needs to be done asap or if I can wait until next time? Also, is asking if her glucose (99) is normal? Oxynade message over the weekend.

## 2023-03-28 LAB
INSULIN COMMENT: NORMAL
INSULIN REFERENCE RANGE:: NORMAL
INSULIN SERPL-ACNC: 22.6 MU/L

## 2023-03-28 NOTE — TELEPHONE ENCOUNTER
Please let her know that labs look great so far. Her sugar, sugar average, kidney, liver, thyroid, cholesterol levels look fantastic. Still waiting on insulin level.

## 2023-03-28 NOTE — TELEPHONE ENCOUNTER
Called and left a VM on [phone] and advised of message below. Instructed to call back with questions or concerns.

## 2023-04-05 ENCOUNTER — PATIENT MESSAGE (OUTPATIENT)
Dept: FAMILY MEDICINE CLINIC | Age: 38
End: 2023-04-05

## 2023-04-05 RX ORDER — LISINOPRIL 10 MG/1
10 TABLET ORAL DAILY
Qty: 30 TABLET | Refills: 0 | Status: SHIPPED | OUTPATIENT
Start: 2023-04-05

## 2023-04-20 RX ORDER — LISINOPRIL 20 MG/1
20 TABLET ORAL DAILY
Qty: 90 TABLET | Refills: 0 | Status: SHIPPED | OUTPATIENT
Start: 2023-04-20

## 2023-04-20 NOTE — TELEPHONE ENCOUNTER
From: Mickel Sandifer  To: Dr. Tatum Siegel  Sent: 4/5/2023 10:19 AM EDT  Subject: Blood Pressure- Still High     Hello, my at home blood pressure results continue to be elevated. 168/115 to 152/90 has been the normal range. Could we start blood pressure medication ASAP? Thanks!

## 2023-08-14 RX ORDER — LISINOPRIL 20 MG/1
20 TABLET ORAL DAILY
Qty: 90 TABLET | Refills: 3 | Status: SHIPPED | OUTPATIENT
Start: 2023-08-14

## 2023-08-14 NOTE — TELEPHONE ENCOUNTER
Medication:   Requested Prescriptions     Pending Prescriptions Disp Refills    lisinopril (PRINIVIL;ZESTRIL) 20 MG tablet 90 tablet 3     Sig: Take 1 tablet by mouth daily        Last Filled:  06/12/2023    Patient Phone Number: [phone] (home)     Last appt: 3/24/2023   Next appt: Visit date not found    Last OARRS: No flowsheet data found.

## 2023-08-18 ENCOUNTER — OFFICE VISIT (OUTPATIENT)
Dept: FAMILY MEDICINE CLINIC | Age: 38
End: 2023-08-18
Payer: COMMERCIAL

## 2023-08-18 VITALS
OXYGEN SATURATION: 98 % | HEIGHT: 67 IN | DIASTOLIC BLOOD PRESSURE: 78 MMHG | BODY MASS INDEX: 42.22 KG/M2 | SYSTOLIC BLOOD PRESSURE: 120 MMHG | WEIGHT: 269 LBS | HEART RATE: 82 BPM

## 2023-08-18 DIAGNOSIS — I83.811 VARICOSE VEINS OF RIGHT LOWER EXTREMITY WITH PAIN: Primary | ICD-10-CM

## 2023-08-18 PROCEDURE — 3078F DIAST BP <80 MM HG: CPT | Performed by: FAMILY MEDICINE

## 2023-08-18 PROCEDURE — 3074F SYST BP LT 130 MM HG: CPT | Performed by: FAMILY MEDICINE

## 2023-08-18 PROCEDURE — 99213 OFFICE O/P EST LOW 20 MIN: CPT | Performed by: FAMILY MEDICINE

## 2023-08-18 SDOH — ECONOMIC STABILITY: FOOD INSECURITY: WITHIN THE PAST 12 MONTHS, THE FOOD YOU BOUGHT JUST DIDN'T LAST AND YOU DIDN'T HAVE MONEY TO GET MORE.: NEVER TRUE

## 2023-08-18 SDOH — ECONOMIC STABILITY: HOUSING INSECURITY
IN THE LAST 12 MONTHS, WAS THERE A TIME WHEN YOU DID NOT HAVE A STEADY PLACE TO SLEEP OR SLEPT IN A SHELTER (INCLUDING NOW)?: NO

## 2023-08-18 SDOH — ECONOMIC STABILITY: FOOD INSECURITY: WITHIN THE PAST 12 MONTHS, YOU WORRIED THAT YOUR FOOD WOULD RUN OUT BEFORE YOU GOT MONEY TO BUY MORE.: NEVER TRUE

## 2023-08-18 SDOH — ECONOMIC STABILITY: INCOME INSECURITY: HOW HARD IS IT FOR YOU TO PAY FOR THE VERY BASICS LIKE FOOD, HOUSING, MEDICAL CARE, AND HEATING?: NOT HARD AT ALL

## 2023-12-01 RX ORDER — SUMATRIPTAN 50 MG/1
TABLET, FILM COATED ORAL
Qty: 18 TABLET | OUTPATIENT
Start: 2023-12-01

## 2023-12-04 ENCOUNTER — PATIENT MESSAGE (OUTPATIENT)
Dept: FAMILY MEDICINE CLINIC | Age: 38
End: 2023-12-04

## 2023-12-04 RX ORDER — SUMATRIPTAN 50 MG/1
50 TABLET, FILM COATED ORAL DAILY PRN
Qty: 9 TABLET | Refills: 11 | Status: SHIPPED | OUTPATIENT
Start: 2023-12-04

## 2023-12-04 RX ORDER — SUMATRIPTAN 50 MG/1
TABLET, FILM COATED ORAL
Qty: 18 TABLET | Refills: 3 | OUTPATIENT
Start: 2023-12-04

## 2023-12-04 NOTE — TELEPHONE ENCOUNTER
Medication:   Requested Prescriptions     Pending Prescriptions Disp Refills    SUMAtriptan (IMITREX) 50 MG tablet [Pharmacy Med Name: SUMAtriptan SUCC 50 MG TABLET] 18 tablet 3     Sig: TAKE ONE TABLET BY MOUTH DAILY AS NEEDED FOR MIGRAINE        Last Filled:  03/06/2023    Patient Phone Number: [phone] (home)     Last appt: 8/18/2023   Next appt: Visit date not found    Last OARRS:        No data to display

## 2024-01-09 ENCOUNTER — OFFICE VISIT (OUTPATIENT)
Dept: FAMILY MEDICINE CLINIC | Age: 39
End: 2024-01-09
Payer: COMMERCIAL

## 2024-01-09 VITALS
DIASTOLIC BLOOD PRESSURE: 80 MMHG | HEIGHT: 67 IN | WEIGHT: 273.4 LBS | OXYGEN SATURATION: 97 % | BODY MASS INDEX: 42.91 KG/M2 | SYSTOLIC BLOOD PRESSURE: 112 MMHG | HEART RATE: 81 BPM

## 2024-01-09 DIAGNOSIS — Z00.00 PREVENTATIVE HEALTH CARE: Primary | ICD-10-CM

## 2024-01-09 DIAGNOSIS — H93.11 TINNITUS OF RIGHT EAR: ICD-10-CM

## 2024-01-09 DIAGNOSIS — H91.93 DECREASED HEARING OF BOTH EARS: ICD-10-CM

## 2024-01-09 LAB
BASOPHILS # BLD: 0 K/UL (ref 0–0.2)
BASOPHILS NFR BLD: 0.5 %
DEPRECATED RDW RBC AUTO: 14.8 % (ref 12.4–15.4)
EOSINOPHIL # BLD: 0.1 K/UL (ref 0–0.6)
EOSINOPHIL NFR BLD: 1.2 %
HCT VFR BLD AUTO: 42.8 % (ref 36–48)
HGB BLD-MCNC: 13.9 G/DL (ref 12–16)
LYMPHOCYTES # BLD: 2.7 K/UL (ref 1–5.1)
LYMPHOCYTES NFR BLD: 28.5 %
MCH RBC QN AUTO: 28.5 PG (ref 26–34)
MCHC RBC AUTO-ENTMCNC: 32.4 G/DL (ref 31–36)
MCV RBC AUTO: 88 FL (ref 80–100)
MONOCYTES # BLD: 0.6 K/UL (ref 0–1.3)
MONOCYTES NFR BLD: 5.9 %
NEUTROPHILS # BLD: 6.1 K/UL (ref 1.7–7.7)
NEUTROPHILS NFR BLD: 63.9 %
PLATELET # BLD AUTO: 268 K/UL (ref 135–450)
PMV BLD AUTO: 8.3 FL (ref 5–10.5)
RBC # BLD AUTO: 4.87 M/UL (ref 4–5.2)
WBC # BLD AUTO: 9.5 K/UL (ref 4–11)

## 2024-01-09 PROCEDURE — 3079F DIAST BP 80-89 MM HG: CPT | Performed by: FAMILY MEDICINE

## 2024-01-09 PROCEDURE — 90471 IMMUNIZATION ADMIN: CPT | Performed by: FAMILY MEDICINE

## 2024-01-09 PROCEDURE — 90674 CCIIV4 VAC NO PRSV 0.5 ML IM: CPT | Performed by: FAMILY MEDICINE

## 2024-01-09 PROCEDURE — 99395 PREV VISIT EST AGE 18-39: CPT | Performed by: FAMILY MEDICINE

## 2024-01-09 PROCEDURE — 36415 COLL VENOUS BLD VENIPUNCTURE: CPT | Performed by: FAMILY MEDICINE

## 2024-01-09 PROCEDURE — 3074F SYST BP LT 130 MM HG: CPT | Performed by: FAMILY MEDICINE

## 2024-01-09 RX ORDER — FLUTICASONE PROPIONATE 50 MCG
2 SPRAY, SUSPENSION (ML) NASAL DAILY
Qty: 16 G | Refills: 0 | Status: SHIPPED | OUTPATIENT
Start: 2024-01-09

## 2024-01-09 ASSESSMENT — PATIENT HEALTH QUESTIONNAIRE - PHQ9
2. FEELING DOWN, DEPRESSED OR HOPELESS: 0
SUM OF ALL RESPONSES TO PHQ QUESTIONS 1-9: 0
SUM OF ALL RESPONSES TO PHQ QUESTIONS 1-9: 0
1. LITTLE INTEREST OR PLEASURE IN DOING THINGS: 0
SUM OF ALL RESPONSES TO PHQ9 QUESTIONS 1 & 2: 0
SUM OF ALL RESPONSES TO PHQ QUESTIONS 1-9: 0
SUM OF ALL RESPONSES TO PHQ QUESTIONS 1-9: 0

## 2024-01-09 NOTE — PROGRESS NOTES
throat, ear pain  Denies chest pain, dyspnea, palpitations  Denies nausea, vomiting, diarrhea  Denies joint pain  Denies depression, anxiety  Denies urinary symptoms  Denies rashes    Current Outpatient Medications   Medication Sig Dispense Refill    fluticasone (FLONASE) 50 MCG/ACT nasal spray 2 sprays by Each Nostril route daily 16 g 0    SUMAtriptan (IMITREX) 50 MG tablet Take 1 tablet by mouth daily as needed for Migraine 9 tablet 11    lisinopril (PRINIVIL;ZESTRIL) 20 MG tablet Take 1 tablet by mouth daily 90 tablet 3    metFORMIN (GLUCOPHAGE-XR) 500 MG extended release tablet TAKE 1 tab twice per day 180 tablet 3    levonorgestrel (MIRENA, 52 MG,) IUD 52 mg 1 each by IntraUTERine route once      cetirizine (ZYRTEC) 10 MG tablet Take 1 tablet by mouth daily       No current facility-administered medications for this visit.        No Known Allergies     Past Medical History:   Diagnosis Date    Back pain     Family history of factor V deficiency     HTN (hypertension)     Hypertension, essential     Migraine     Morbid obesity with BMI of 40.0-44.9, adult (HCC)     Neck pain on left side     Pre-operative clearance     Preeclampsia 07/13/2014    Severe obesity (BMI 35.0-35.9 with comorbidity) (HCC)     Skin abnormality     scalp cysts in past        Past Surgical History:   Procedure Laterality Date    SKIN BIOPSY          Social History     Social History Narrative    , 16 yo and 9 yo as of 2024, , likes to hike, garden, spend time with kids        Family History   Problem Relation Age of Onset    Cancer Mother         thyroid    Diabetes Mother     Thyroid Cancer Mother     Seizures Mother     Alcohol Abuse Father     Elevated Lipids Father     Hypertension Father     High Cholesterol Father     Seizures Sister     Cancer Paternal Grandmother         skin, NMSC          RODNEY GARCIA DO

## 2024-01-10 LAB
ALBUMIN SERPL-MCNC: 4.8 G/DL (ref 3.4–5)
ALBUMIN/GLOB SERPL: 2.2 {RATIO} (ref 1.1–2.2)
ALP SERPL-CCNC: 44 U/L (ref 40–129)
ALT SERPL-CCNC: 20 U/L (ref 10–40)
ANION GAP SERPL CALCULATED.3IONS-SCNC: 10 MMOL/L (ref 3–16)
AST SERPL-CCNC: 24 U/L (ref 15–37)
BILIRUB SERPL-MCNC: 0.4 MG/DL (ref 0–1)
BUN SERPL-MCNC: 13 MG/DL (ref 7–20)
CALCIUM SERPL-MCNC: 10.1 MG/DL (ref 8.3–10.6)
CHLORIDE SERPL-SCNC: 103 MMOL/L (ref 99–110)
CHOLEST SERPL-MCNC: 175 MG/DL (ref 0–199)
CO2 SERPL-SCNC: 24 MMOL/L (ref 21–32)
CORTIS AM PEAK SERPL-MCNC: 9 UG/DL (ref 4.3–22.4)
CREAT SERPL-MCNC: 0.7 MG/DL (ref 0.6–1.1)
GFR SERPLBLD CREATININE-BSD FMLA CKD-EPI: >60 ML/MIN/{1.73_M2}
GLUCOSE SERPL-MCNC: 97 MG/DL (ref 70–99)
HDLC SERPL-MCNC: 42 MG/DL (ref 40–60)
LDLC SERPL CALC-MCNC: 101 MG/DL
POTASSIUM SERPL-SCNC: 4.8 MMOL/L (ref 3.5–5.1)
PROT SERPL-MCNC: 7 G/DL (ref 6.4–8.2)
SODIUM SERPL-SCNC: 137 MMOL/L (ref 136–145)
T3FREE SERPL-MCNC: 3.4 PG/ML (ref 2.3–4.2)
T4 FREE SERPL-MCNC: 1.1 NG/DL (ref 0.9–1.8)
TRIGL SERPL-MCNC: 160 MG/DL (ref 0–150)
TSH SERPL DL<=0.005 MIU/L-ACNC: 2.32 UIU/ML (ref 0.27–4.2)
VLDLC SERPL CALC-MCNC: 32 MG/DL

## 2024-01-11 LAB
EST. AVERAGE GLUCOSE BLD GHB EST-MCNC: 108.3 MG/DL
HBA1C MFR BLD: 5.4 %

## 2024-02-08 ENCOUNTER — PROCEDURE VISIT (OUTPATIENT)
Dept: AUDIOLOGY | Age: 39
End: 2024-02-08
Payer: COMMERCIAL

## 2024-02-08 ENCOUNTER — OFFICE VISIT (OUTPATIENT)
Dept: ENT CLINIC | Age: 39
End: 2024-02-08
Payer: COMMERCIAL

## 2024-02-08 VITALS
SYSTOLIC BLOOD PRESSURE: 125 MMHG | BODY MASS INDEX: 43 KG/M2 | HEIGHT: 67 IN | WEIGHT: 274 LBS | RESPIRATION RATE: 16 BRPM | DIASTOLIC BLOOD PRESSURE: 77 MMHG | TEMPERATURE: 97.9 F | HEART RATE: 74 BPM | OXYGEN SATURATION: 98 %

## 2024-02-08 DIAGNOSIS — H91.90 HEARING LOSS, UNSPECIFIED HEARING LOSS TYPE, UNSPECIFIED LATERALITY: Primary | ICD-10-CM

## 2024-02-08 DIAGNOSIS — H90.41 SENSORINEURAL HEARING LOSS (SNHL) OF RIGHT EAR WITH UNRESTRICTED HEARING OF LEFT EAR: Primary | ICD-10-CM

## 2024-02-08 DIAGNOSIS — H93.11 TINNITUS OF RIGHT EAR: ICD-10-CM

## 2024-02-08 DIAGNOSIS — H93.A1 PULSATILE TINNITUS OF RIGHT EAR: ICD-10-CM

## 2024-02-08 PROCEDURE — 99204 OFFICE O/P NEW MOD 45 MIN: CPT | Performed by: OTOLARYNGOLOGY

## 2024-02-08 PROCEDURE — 3074F SYST BP LT 130 MM HG: CPT | Performed by: OTOLARYNGOLOGY

## 2024-02-08 PROCEDURE — 92567 TYMPANOMETRY: CPT | Performed by: AUDIOLOGIST

## 2024-02-08 PROCEDURE — 3078F DIAST BP <80 MM HG: CPT | Performed by: OTOLARYNGOLOGY

## 2024-02-08 PROCEDURE — 92557 COMPREHENSIVE HEARING TEST: CPT | Performed by: AUDIOLOGIST

## 2024-02-08 NOTE — PROGRESS NOTES
function.  Acoustic Reflexes: Ipsilateral: Could not maintain seal. Contralateral: Could not maintain seal.    LEFT EAR:  Hearing Sensitivity: Normal hearing sensitivity   Speech Recognition Threshold: 10 dB HL  Word Recognition:Excellent (96%), based on NU-6 25-word list at 55 dBHL using recorded speech stimuli.    Tympanometry: Normal peak pressure and compliance, Type A tympanogram, consistent with normal middle ear function.  Acoustic Reflexes: Ipsilateral: Could not maintain seal. Contralateral: Could not maintain seal.    Reliability: Good  Transducer: Inserts    See scanned audiogram dated 2/8/2024  for results.        Patient completed Tinnitus Handicap Inventory (THI) today. THI Score: 32 (Grade 2)                PATIENT EDUCATION:     The following items were discussed with the patient:   - Good Communication Strategies  - Hearing Loss and Hearing Aids  - Tinnitus Management Strategies    - Noise-Induced Hearing Loss and use of Hearing Protection Devices (HPDs)     Educational information was shared in the After Visit Summary.                                                RECOMMENDATIONS:                                                                                                                                                                                                                                                          The following items are recommended based on patient report and results from today's appointment:   - Continue medical follow-up with Terence Valles MD.   - Retest hearing as medically indicated and/or sooner if a change in hearing is noted.  - If desired, schedule a Hearing Aid Evaluation (HAE) appointment to discuss hearing aid options.  - Utilize \"Good Communication Strategies\" as discussed to assist in speech understanding with communication partners.  - Maintain a sound enriched environment to assist in the management of tinnitus symptoms.  - Use hearing protection devices

## 2024-02-08 NOTE — PATIENT INSTRUCTIONS
loss. An audiologist can help you find and use the best hearing aid for you.  Tinnitus maskers look like hearing aids. They make a sound that masks, or covers up, the tinnitus. The masking sound distracts you from the ringing in your ears. You may be able to use a masker and a hearing aid at the same time.  Sound machines can be useful at night or during quiet times. There are machines you can buy at the store. Or, you can find apps on your phone that make sounds, like the ocean or rainfall. Fish tanks, fans, quiet music, and indoor waterfalls can help, as well.    Ways to manage/cope with tinnitus  Some tinnitus may last a long time. To manage your tinnitus, try to:  Avoid noises that you think caused your tinnitus. If you can't avoid loud noises, wear earplugs or earmuffs.  Ignore the sound by paying attention to other things.  Keeping your brain busy with other tasks or background noise can help your brain not focus on the tinnitus.  Try to not give the tinnitus an emotional reaction.  Do your best to ignore the sound and not let it bother you. Relax using biofeedback, meditation, or yoga. Feeling stressed and being tired can make tinnitus worse.  Play music or white noise to help you sleep.  Background noise may cover up the noise that you hear in your ears.  You can buy a tabletop machine or a device that sits under your pillow to play soothing sounds, like ocean waves.  Smart phones have free apps, such as Whist, Relax Melodies, ReSound Relief, and White Noise Lite.  These apps have different types of sounds/noise, some of which you can blend together to find sounds that are most soothing to you.  Hearing aid technology, especially when there is some hearing loss, may help reduce tinnitus symptoms by giving your brain better access to the sounds it is missing.  There are some hearing aids with built-in noise generator programs, which may help when amplification alone is not enough.        Additional resources

## 2024-02-08 NOTE — PROGRESS NOTES
[facility]  DIVISION OF OTOLARYNGOLOGY- HEAD & NECK SURGERY  CONSULT      Patient Name: Nikki Benz  Medical Record Number:  [id number]  Primary Care Physician:  Mariella Yusuf DO  Date of Consultation: 2/8/2024    Chief Complaint: Right ear issues        HISTORY OF PRESENT ILLNESS  Nikki is a(n) 38 y.o. female who presents for evaluation of right ear issues.  The patient says she has been having ringing in the right ear for about 8 weeks.  She says most the time it is fairly constant, but she does sometimes have a pulsatile sensation to it.  She denies any other symptoms.  She does not have any vertigo.  She does not have a significant ear history.  She has never had ear surgery.  She has no ear pain.  She denies significant anxiety or depression.  She does have a history of migraine headaches.  She said that she did shoot guns growing up quite a bit.  She is a right-handed shooter.  She denies a history of heart or lung issues.  No family history of anything like aneurysms.            REVIEW OF SYSTEMS  As above    PHYSICAL EXAM  GENERAL: No Acute Distress, Alert and Oriented, no Hoarseness, strong voice  EYES: EOMI, Anti-icteric  HENT:   Head: Normocephalic and atraumatic.   Face:  Symmetric, facial nerve intact, no sinus tenderness  Right Ear: Normal external ear, normal external auditory canal, intact tympanic membrane with normal mobility and aerated middle ear  Left Ear: Normal external ear, normal external auditory canal, intact tympanic membrane with normal mobility and aerated middle ear  Mouth/Oral Cavity:  normal lips, Uvula is midline, no mucosal lesions, no trismus  Oropharynx/Larynx:  normal oropharynx,  Nose:Normal external nasal appearance.  NECK: Normal range of motion, no thyromegaly, trachea is midline, no lymphadenopathy, no neck masses, no crepitus      The patient had an audiogram that showed normal hearing bilaterally other than a mild hearing loss at 4 kHz on the right

## 2024-02-13 ENCOUNTER — HOSPITAL ENCOUNTER (EMERGENCY)
Age: 39
Discharge: HOME | End: 2024-02-13
Attending: STUDENT IN AN ORGANIZED HEALTH CARE EDUCATION/TRAINING PROGRAM
Payer: COMMERCIAL

## 2024-02-13 VITALS
TEMPERATURE: 98 F | HEIGHT: 67 IN | OXYGEN SATURATION: 98 % | HEART RATE: 72 BPM | RESPIRATION RATE: 18 BRPM | BODY MASS INDEX: 42.87 KG/M2 | WEIGHT: 273.15 LBS | SYSTOLIC BLOOD PRESSURE: 123 MMHG | DIASTOLIC BLOOD PRESSURE: 83 MMHG

## 2024-02-13 DIAGNOSIS — M79.604 RIGHT LEG PAIN: Primary | ICD-10-CM

## 2024-02-13 DIAGNOSIS — R73.9 HYPERGLYCEMIA: ICD-10-CM

## 2024-02-13 LAB
ANION GAP SERPL CALCULATED.3IONS-SCNC: 14 MMOL/L (ref 3–16)
BASOPHILS # BLD: 0 K/UL (ref 0–0.2)
BASOPHILS NFR BLD: 0.2 %
BUN SERPL-MCNC: 16 MG/DL (ref 7–20)
CALCIUM SERPL-MCNC: 9.9 MG/DL (ref 8.3–10.6)
CHLORIDE SERPL-SCNC: 103 MMOL/L (ref 99–110)
CO2 SERPL-SCNC: 21 MMOL/L (ref 21–32)
CREAT SERPL-MCNC: 0.7 MG/DL (ref 0.6–1.1)
D DIMER: <0.27 UG/ML FEU (ref 0–0.6)
DEPRECATED RDW RBC AUTO: 12.9 % (ref 12.4–15.4)
EOSINOPHIL # BLD: 0.1 K/UL (ref 0–0.6)
EOSINOPHIL NFR BLD: 1 %
GFR SERPLBLD CREATININE-BSD FMLA CKD-EPI: >60 ML/MIN/{1.73_M2}
GLUCOSE SERPL-MCNC: 141 MG/DL (ref 70–99)
HCG SERPL QL: NEGATIVE
HCT VFR BLD AUTO: 39.1 % (ref 36–48)
HGB BLD-MCNC: 13 G/DL (ref 12–16)
IMM GRANULOCYTES # BLD: 0 K/UL (ref 0–0.2)
IMM GRANULOCYTES NFR BLD: 0.2 %
LYMPHOCYTES # BLD: 2.7 K/UL (ref 1–5.1)
LYMPHOCYTES NFR BLD: 29.6 %
MCH RBC QN AUTO: 29.3 PG (ref 26–34)
MCHC RBC AUTO-ENTMCNC: 33.2 G/DL (ref 32–36.4)
MCV RBC AUTO: 88.1 FL (ref 80–100)
MONOCYTES # BLD: 0.6 K/UL (ref 0–1.3)
MONOCYTES NFR BLD: 6.2 %
NEUTROPHILS # BLD: 5.7 K/UL (ref 1.7–7.7)
NEUTROPHILS NFR BLD: 62.8 %
NT-PROBNP SERPL-MCNC: <36 PG/ML (ref 0–124)
PLATELET # BLD AUTO: 235 K/UL (ref 135–450)
PMV BLD AUTO: 9.1 FL (ref 5–10.5)
POTASSIUM SERPL-SCNC: 3.6 MMOL/L (ref 3.5–5.1)
RBC # BLD AUTO: 4.44 M/UL (ref 4–5.2)
SODIUM SERPL-SCNC: 138 MMOL/L (ref 136–145)
TROPONIN, HIGH SENSITIVITY: <6 NG/L (ref 0–14)
WBC # BLD AUTO: 9.1 K/UL (ref 4–11)

## 2024-02-13 PROCEDURE — 84703 CHORIONIC GONADOTROPIN ASSAY: CPT

## 2024-02-13 PROCEDURE — 99283 EMERGENCY DEPT VISIT LOW MDM: CPT

## 2024-02-13 PROCEDURE — 85025 COMPLETE CBC W/AUTO DIFF WBC: CPT

## 2024-02-13 PROCEDURE — 85379 FIBRIN DEGRADATION QUANT: CPT

## 2024-02-13 PROCEDURE — 83880 ASSAY OF NATRIURETIC PEPTIDE: CPT

## 2024-02-13 PROCEDURE — 84484 ASSAY OF TROPONIN QUANT: CPT

## 2024-02-13 PROCEDURE — 36415 COLL VENOUS BLD VENIPUNCTURE: CPT

## 2024-02-13 PROCEDURE — 80048 BASIC METABOLIC PNL TOTAL CA: CPT

## 2024-02-13 NOTE — ED PROVIDER NOTES
[facility]      EMERGENCY MEDICINE     Pt Name: Nikki Benz  MRN: [id number]  Birthdate 1985  Date of evaluation: 2/13/2024  Provider: Pablo Hdz MD    CHIEF COMPLAINT       Chief Complaint   Patient presents with    Leg Pain     C/O sharp pain and swelling in  right knee/leg area.  started last night. She denies any injury.      HISTORY OF PRESENT ILLNESS   Nikki Benz is a 38 y.o. female who presents to the emergency department for right calf pain over the last day and a half.  This been persistent.  No recent traumatic injuries falls fevers or chills.  She has family history of factor V Leiden deficiency but has been tested and negative.  She has no recent surgeries or recent broken bones, no long car rides or plane rides but she does have a hormonal IUD in place.        PASTMEDICAL HISTORY     Past Medical History:   Diagnosis Date    Back pain     Family history of factor V deficiency     HTN (hypertension)     Hypertension, essential     Migraine     Morbid obesity with BMI of 40.0-44.9, adult (formerly [facility])     Neck pain on left side     Pre-operative clearance     Preeclampsia 07/13/2014    Severe obesity (BMI 35.0-35.9 with comorbidity) (formerly [facility])     Skin abnormality     scalp cysts in past       Patient Active Problem List   Diagnosis Code    Morbid obesity with BMI of 40.0-44.9, adult (HCC) E66.01, Z68.41    Hypertension, essential I10    Migraine G43.909    Family history of factor V deficiency Z83.2     SURGICAL HISTORY       Past Surgical History:   Procedure Laterality Date    SKIN BIOPSY         CURRENT MEDICATIONS       Previous Medications    CETIRIZINE (ZYRTEC) 10 MG TABLET    Take 1 tablet by mouth daily    FLUTICASONE (FLONASE) 50 MCG/ACT NASAL SPRAY    2 sprays by Each Nostril route daily    LEVONORGESTREL (MIRENA, 52 MG,) IUD 52 MG    1 each by IntraUTERine route once    LISINOPRIL (PRINIVIL;ZESTRIL) 20 MG TABLET    Take 1 tablet by mouth

## 2024-02-14 ENCOUNTER — HOSPITAL ENCOUNTER (OUTPATIENT)
Dept: CT IMAGING | Age: 39
Discharge: HOME | End: 2024-02-14
Attending: OTOLARYNGOLOGY
Payer: COMMERCIAL

## 2024-02-14 DIAGNOSIS — H93.A1 PULSATILE TINNITUS OF RIGHT EAR: ICD-10-CM

## 2024-02-14 PROCEDURE — 6360000004 HC RX CONTRAST MEDICATION: Performed by: OTOLARYNGOLOGY

## 2024-02-14 PROCEDURE — 70496 CT ANGIOGRAPHY HEAD: CPT

## 2024-02-14 PROCEDURE — 70450 CT HEAD/BRAIN W/O DYE: CPT

## 2024-02-14 RX ADMIN — IOMEPROL INJECTION 100 ML: 714 INJECTION, SOLUTION INTRAVASCULAR at 08:46

## 2024-02-14 NOTE — ED PROVIDER NOTES
[facility] Emergency Department      Pt Name: Nikki Benz  MRN: [id number]  Birthdate 1985  Date of evaluation: 2/13/2024  Provider: TOMI CROSS MD  I took over this patient's care from the leaving physician at approximately 1900  I was to check the pending results and finalize the disposition.   Nikki Benz has a past medical history of Back pain, Family history of factor V deficiency, HTN (hypertension), Hypertension, essential, Migraine, Morbid obesity with BMI of 40.0-44.9, adult (HCC), Neck pain on left side, Pre-operative clearance, Preeclampsia (07/13/2014), Severe obesity (BMI 35.0-35.9 with comorbidity) (HCC), and Skin abnormality.  She has a past surgical history that includes skin biopsy.     Vitals:  Blood pressure (!) 164/96, pulse 84, temperature 98 °F (36.7 °C), temperature source Tympanic, resp. rate 16, height 1.702 m (5' 7\"), weight 123.9 kg (273 lb 2.4 oz), SpO2 98 %, not currently breastfeeding.  Constitutional:  38 y.o. female alert, cooperative  HENT:  Atraumatic, mucous membranes moist  Eyes:   Conjunctiva clear, no icterus  Neck:  Supple, no visible JVD  Thorax & Lungs:  No accessory muscle usage  Abdomen:  Non distended  Back:  No deformity  Genitalia:  Deferred  Rectal:  Deferred  Extremities:  No cyanosis, no edema or palpable, cord, NVI  Skin:  Warm, dry, no rash of exposed areas  Neurologic:  Alert, no slurred speech  Psychiatric:  Affect appropriate    RESULTS / MEDICAL DECISION MAKING:  Labs resulted at the time of this note reviewed.   Labs Reviewed   BASIC METABOLIC PANEL W/ REFLEX TO MG FOR LOW K - Abnormal; Notable for the following components:       Result Value    Glucose 141 (*)     All other components within normal limits   CBC WITH AUTO DIFFERENTIAL   TROPONIN   BRAIN NATRIURETIC PEPTIDE   D-DIMER, QUANTITATIVE   HCG, SERUM, QUALITATIVE     ED COURSE:    Vitals:    02/13/24 1830   BP: (!) 164/96   Pulse: 84   Resp: 16   Temp: 98 °F (36.7 °C)   TempSrc:

## 2024-02-21 ENCOUNTER — TELEPHONE (OUTPATIENT)
Dept: ENT CLINIC | Age: 39
End: 2024-02-21

## 2024-02-21 NOTE — TELEPHONE ENCOUNTER
----- Message from Terence Valles MD sent at 2/21/2024 10:46 AM EST -----  Regarding: CTA  Could we call and let this patient know that her CTA did not show anything concerning.  Tried to call and I got no answer.  Thanks

## 2024-04-06 ENCOUNTER — PATIENT MESSAGE (OUTPATIENT)
Dept: FAMILY MEDICINE CLINIC | Age: 39
End: 2024-04-06

## 2024-04-06 DIAGNOSIS — E78.5 DYSLIPIDEMIA: Primary | ICD-10-CM

## 2024-04-06 DIAGNOSIS — E88.819 INSULIN RESISTANCE: ICD-10-CM

## 2024-04-08 NOTE — TELEPHONE ENCOUNTER
From: Nikki Benz  To: Dr. Mariella Yusuf  Sent: 4/6/2024 5:31 PM EDT  Subject: Mounjaro    Hello,    [employer] does cover Mounjaro if medically necessary. Could we begin this medication as discussed for insulin and weight loss?    Thanks!

## 2024-04-09 RX ORDER — TIRZEPATIDE 2.5 MG/.5ML
2.5 INJECTION, SOLUTION SUBCUTANEOUS WEEKLY
Qty: 2 ML | Refills: 0 | Status: SHIPPED | OUTPATIENT
Start: 2024-04-09

## 2024-04-10 RX ORDER — ONDANSETRON 4 MG/1
4 TABLET, FILM COATED ORAL 3 TIMES DAILY PRN
Qty: 15 TABLET | Refills: 0 | Status: SHIPPED | OUTPATIENT
Start: 2024-04-10

## 2024-04-29 RX ORDER — METFORMIN HYDROCHLORIDE 500 MG/1
TABLET, EXTENDED RELEASE ORAL
Qty: 180 TABLET | Refills: 3 | Status: SHIPPED | OUTPATIENT
Start: 2024-04-29

## 2024-04-29 NOTE — TELEPHONE ENCOUNTER
Medication:   Requested Prescriptions     Pending Prescriptions Disp Refills    metFORMIN (GLUCOPHAGE-XR) 500 MG extended release tablet [Pharmacy Med Name: METFORMIN HCL  MG TABLET] 180 tablet 3     Sig: TAKE ONE TABLET BY MOUTH TWICE A DAY       Last Filled:  10/28/2022    Patient Phone Number: [phone] (home)     Last appt: 1/9/2024   Next appt: Visit date not found

## 2024-05-06 DIAGNOSIS — E88.819 INSULIN RESISTANCE: ICD-10-CM

## 2024-05-06 DIAGNOSIS — E78.5 DYSLIPIDEMIA: ICD-10-CM

## 2024-05-06 RX ORDER — TIRZEPATIDE 2.5 MG/.5ML
2.5 INJECTION, SOLUTION SUBCUTANEOUS WEEKLY
Qty: 2 ML | Refills: 0 | Status: CANCELLED | OUTPATIENT
Start: 2024-05-06

## 2024-05-06 NOTE — TELEPHONE ENCOUNTER
Medication:   Requested Prescriptions     Pending Prescriptions Disp Refills    Tirzepatide (MOUNJARO) 2.5 MG/0.5ML SOPN SC injection 2 mL 0     Sig: Inject 0.5 mLs into the skin once a week        Last Filled:  4/9/24    Patient Phone Number: [phone] (home)     Last appt: 1/9/2024   Next appt: Visit date not found    Last OARRS:        No data to display

## 2024-05-07 ENCOUNTER — PATIENT MESSAGE (OUTPATIENT)
Dept: FAMILY MEDICINE CLINIC | Age: 39
End: 2024-05-07

## 2024-05-07 ENCOUNTER — TELEPHONE (OUTPATIENT)
Dept: ADMINISTRATIVE | Age: 39
End: 2024-05-07

## 2024-05-07 NOTE — TELEPHONE ENCOUNTER
Submitted PA for Mounjaro 5MG/0.5ML pen-injectors   Via CM Key: [id number]  STATUS: PENDING.    Follow up done daily; if no decision with in three days we will refax.  If another three days goes by with no decision will call the insurance for status.

## 2024-05-09 RX ORDER — TIRZEPATIDE 2.5 MG/.5ML
2.5 INJECTION, SOLUTION SUBCUTANEOUS WEEKLY
Qty: 2 ML | Refills: 1 | Status: SHIPPED | OUTPATIENT
Start: 2024-05-09

## 2024-05-10 ENCOUNTER — TELEPHONE (OUTPATIENT)
Dept: ADMINISTRATIVE | Age: 39
End: 2024-05-10

## 2024-05-10 NOTE — TELEPHONE ENCOUNTER
Submitted PA for Mounjaro 2.5MG/0.5ML pen-injectors   Via CM (Key: [id number]) STATUS: PENDING.    A previously denied or partially denied PA request has been located for this member. To initiate an appeal or reconsideration please call [phone]. Thank you.     Follow up done daily; if no decision with in three days we will refax.  If another three days goes by with no decision will call the insurance for status.

## 2024-06-17 RX ORDER — METFORMIN HYDROCHLORIDE 500 MG/1
TABLET, EXTENDED RELEASE ORAL
Qty: 180 TABLET | Refills: 3 | Status: SHIPPED | OUTPATIENT
Start: 2024-06-17

## 2024-06-17 NOTE — TELEPHONE ENCOUNTER
Medication:   Requested Prescriptions     Pending Prescriptions Disp Refills    metFORMIN (GLUCOPHAGE-XR) 500 MG extended release tablet 180 tablet 3     Sig: TAKE ONE TABLET BY MOUTH TWICE A DAY       Last Filled:  4/29/2024    Patient Phone Number: [phone] (home)     Last appt: 1/9/2024   Next appt: Visit date not found

## 2024-08-12 ENCOUNTER — PROCEDURE VISIT (OUTPATIENT)
Dept: AUDIOLOGY | Age: 39
End: 2024-08-12
Payer: COMMERCIAL

## 2024-08-12 ENCOUNTER — OFFICE VISIT (OUTPATIENT)
Dept: ENT CLINIC | Age: 39
End: 2024-08-12
Payer: COMMERCIAL

## 2024-08-12 VITALS
BODY MASS INDEX: 40.65 KG/M2 | SYSTOLIC BLOOD PRESSURE: 114 MMHG | WEIGHT: 259 LBS | HEART RATE: 79 BPM | DIASTOLIC BLOOD PRESSURE: 79 MMHG | HEIGHT: 67 IN | OXYGEN SATURATION: 95 %

## 2024-08-12 DIAGNOSIS — H93.8X1 SENSATION OF FULLNESS IN RIGHT EAR: ICD-10-CM

## 2024-08-12 DIAGNOSIS — H93.11 TINNITUS OF RIGHT EAR: ICD-10-CM

## 2024-08-12 DIAGNOSIS — H91.8X3 ASYMMETRICAL HEARING LOSS: ICD-10-CM

## 2024-08-12 DIAGNOSIS — H90.41 SENSORINEURAL HEARING LOSS (SNHL) OF RIGHT EAR WITH UNRESTRICTED HEARING OF LEFT EAR: Primary | ICD-10-CM

## 2024-08-12 PROCEDURE — 3074F SYST BP LT 130 MM HG: CPT | Performed by: OTOLARYNGOLOGY

## 2024-08-12 PROCEDURE — 92553 AUDIOMETRY AIR & BONE: CPT

## 2024-08-12 PROCEDURE — 92567 TYMPANOMETRY: CPT

## 2024-08-12 PROCEDURE — 3078F DIAST BP <80 MM HG: CPT | Performed by: OTOLARYNGOLOGY

## 2024-08-12 PROCEDURE — 99213 OFFICE O/P EST LOW 20 MIN: CPT | Performed by: OTOLARYNGOLOGY

## 2024-08-12 RX ORDER — LISINOPRIL 20 MG/1
20 TABLET ORAL DAILY
Qty: 90 TABLET | Refills: 3 | Status: SHIPPED | OUTPATIENT
Start: 2024-08-12

## 2024-08-12 NOTE — PROGRESS NOTES
Nikki Benz   1985, 39 y.o. female   [id number]       Referring Provider: Terence Valles MD  Referral Type: In an order in Epic    Reason for Visit: Re-evaluation of disorder as deemed medically necessary by referring provider    ADULT AUDIOLOGIC EVALUATION      Nikki Benz is a 39 y.o. female seen today, 8/12/2024 , for a recheck audiologic evaluation.  Patient was seen by Terence Valles MD following today's evaluation.    AUDIOLOGIC AND OTHER PERTINENT MEDICAL HISTORY:      Nikki Benz reports an improvement in her right-sided tinnitus and ear fullness. She noted that she saw an acupuncturist and also changed to a less stressful job, which may have contributed. Patient reported some difficulty hearing in noise, but overall denied any concerns for her hearing.      Case history and results from previous audiogram on 2/8/2024  Nikki Benz noted tinnitus and history of recreational noise exposure.    Patient reports right tinnitus that is intermittent in nature. She also notes decreased hearing in the right ear. Patient notes a history of shooting guns.      Nikki Benz denied otalgia, aural fullness, otorrhea, dizziness, imbalance, history of falls, history of head trauma, history of ear surgery, and family history of hearing loss.    Testing revealed a normal sloping to mild sensorineural notch rising to normal hearing in the right ear and normal hearing in the left ear.     Date: 8/12/2024     IMPRESSIONS:      Today's results revealed Normal hearing sensitivity 250-8000 Hz with a mild to moderate sensorineural notch 4170-9128 Hz in the right ear and normal hearing in the left ear. Tympanometry indicates normal middle ear function. Discussed test results and implications with patient. Discussed use of tinnitus management strategies. Provided basic tinnitus education, including the neurophysiological model. Discussed noise exposure and the importance of appropriate hearing protection

## 2024-08-12 NOTE — PROGRESS NOTES
[facility]  DIVISION OF OTOLARYNGOLOGY- HEAD & NECK SURGERY  Follow up      Patient Name: Nikki Benz  Medical Record Number:  [id number]  Primary Care Physician:  Mariella Yusuf DO  Date of Consultation: 8/12/2024    Chief Complaint: Right ear issues        Interval History    The patient is following up for her right ear.  I saw her in February 2024 and she was having pulsatile tinnitus of the right ear.  She had a notch on the right side just at 4 kHz that was sensorineural.  Given the pulsatile nature I did get a CTA which was normal.  The patient states she still has some tinnitus on the right side, but now is less pulsatile.  Otherwise no changes.  No issues on the left ear          REVIEW OF SYSTEMS  As above    PHYSICAL EXAM  GENERAL: No Acute Distress, Alert and Oriented, no Hoarseness, strong voice  EYES: EOMI, Anti-icteric  HENT:   Head: Normocephalic and atraumatic.   Face:  Symmetric, facial nerve intact, no sinus tenderness  Right Ear: Normal external ear, normal external auditory canal, intact tympanic membrane with normal mobility and aerated middle ear  Left Ear: Normal external ear, normal external auditory canal, intact tympanic membrane with normal mobility and aerated middle ear  Mouth/Oral Cavity:  normal lips, Uvula is midline, no mucosal lesions, no trismus  Oropharynx/Larynx:  normal oropharynx  Nose:Normal external nasal appearance  NECK: Normal range of motion, no thyromegaly, trachea is midline, no lymphadenopathy, no neck masses, no crepitus      The patient had an audiogram today.  On the right side she has normal sloping to moderate since no hearing loss at 4 kHz on the right.  She has normal hearing on the left.  The notch was down to about 40 dB on the audiogram in February, now is 45.  Seems to be a little worse at 3 kHz as well.          ASSESSMENT/PLAN  1. Sensorineural hearing loss (SNHL) of right ear with unrestricted hearing of left ear  She has persistent

## 2024-08-12 NOTE — TELEPHONE ENCOUNTER
Medication:   Requested Prescriptions     Pending Prescriptions Disp Refills    lisinopril (PRINIVIL;ZESTRIL) 20 MG tablet 90 tablet 3     Sig: Take 1 tablet by mouth daily        Last Filled:  08/14/23    Patient Phone Number: [phone] (home)     Last appt: 1/9/2024   Next appt: Visit date not found    Last OARRS:        No data to display

## 2024-08-23 ENCOUNTER — HOSPITAL ENCOUNTER (OUTPATIENT)
Dept: MRI IMAGING | Age: 39
Discharge: HOME | End: 2024-08-23
Attending: OTOLARYNGOLOGY
Payer: COMMERCIAL

## 2024-08-23 DIAGNOSIS — H93.11 TINNITUS OF RIGHT EAR: ICD-10-CM

## 2024-08-23 DIAGNOSIS — H91.8X3 ASYMMETRICAL HEARING LOSS: ICD-10-CM

## 2024-08-23 DIAGNOSIS — H90.41 SENSORINEURAL HEARING LOSS (SNHL) OF RIGHT EAR WITH UNRESTRICTED HEARING OF LEFT EAR: ICD-10-CM

## 2024-08-23 PROCEDURE — A9579 GAD-BASE MR CONTRAST NOS,1ML: HCPCS | Performed by: OTOLARYNGOLOGY

## 2024-08-23 PROCEDURE — 70553 MRI BRAIN STEM W/O & W/DYE: CPT

## 2024-08-23 PROCEDURE — 6360000004 HC RX CONTRAST MEDICATION: Performed by: OTOLARYNGOLOGY

## 2024-08-23 RX ADMIN — GADOTERIDOL 20 ML: 279.3 INJECTION, SOLUTION INTRAVENOUS at 08:06

## 2024-08-26 ENCOUNTER — PATIENT MESSAGE (OUTPATIENT)
Dept: FAMILY MEDICINE CLINIC | Age: 39
End: 2024-08-26

## 2024-08-26 ENCOUNTER — TELEPHONE (OUTPATIENT)
Dept: ENT CLINIC | Age: 39
End: 2024-08-26

## 2024-08-26 DIAGNOSIS — H91.8X3 ASYMMETRICAL HEARING LOSS: ICD-10-CM

## 2024-08-26 DIAGNOSIS — H93.19 TINNITUS, UNSPECIFIED LATERALITY: ICD-10-CM

## 2024-08-26 DIAGNOSIS — R93.89 ABNORMAL MRI: Primary | ICD-10-CM

## 2024-08-26 NOTE — TELEPHONE ENCOUNTER
I called the patient to let her know that I reviewed her MRI.  I see no evidence of acoustic neuroma or other explanation for the pulsatile tinnitus.  There was a small lesion of the left parotid this likely a lymph node.  At this point I think we would just have her follow-up in year with an audiogram.  If she notices sudden changes in her hearing she should follow-up sooner.  If there is any enlargement of the lesion of the left parotid we can further evaluate it, but it subcentimeter and likely a lymph node.

## 2024-12-12 NOTE — TELEPHONE ENCOUNTER
LVM to call the office back to schedule with new provider   Last office visit :01/09/2024      No future appointments.

## 2024-12-13 RX ORDER — SUMATRIPTAN 50 MG/1
50 TABLET, FILM COATED ORAL DAILY PRN
Qty: 9 TABLET | Refills: 11 | Status: SHIPPED | OUTPATIENT
Start: 2024-12-13

## 2025-02-17 ENCOUNTER — TELEPHONE (OUTPATIENT)
Dept: FAMILY MEDICINE CLINIC | Age: 40
End: 2025-02-17
Payer: COMMERCIAL

## 2025-02-17 DIAGNOSIS — Z13.220 SCREENING, LIPID: Primary | ICD-10-CM

## 2025-02-17 DIAGNOSIS — R53.83 OTHER FATIGUE: ICD-10-CM

## 2025-02-17 DIAGNOSIS — R73.03 PREDIABETES: ICD-10-CM

## 2025-02-17 DIAGNOSIS — I10 HYPERTENSION, ESSENTIAL: ICD-10-CM

## 2025-02-17 PROCEDURE — 36415 COLL VENOUS BLD VENIPUNCTURE: CPT | Performed by: NURSE PRACTITIONER

## 2025-02-17 NOTE — TELEPHONE ENCOUNTER
Pt called in stating that she has an appointment on the 28th to establish and she wanted to see of she could get labs drawn before so the results could be discussed at appt. She can be reached at [phone]

## 2025-02-27 SDOH — HEALTH STABILITY: PHYSICAL HEALTH: ON AVERAGE, HOW MANY DAYS PER WEEK DO YOU ENGAGE IN MODERATE TO STRENUOUS EXERCISE (LIKE A BRISK WALK)?: 3 DAYS

## 2025-02-27 SDOH — HEALTH STABILITY: PHYSICAL HEALTH: ON AVERAGE, HOW MANY MINUTES DO YOU ENGAGE IN EXERCISE AT THIS LEVEL?: 30 MIN

## 2025-02-28 ENCOUNTER — OFFICE VISIT (OUTPATIENT)
Dept: FAMILY MEDICINE CLINIC | Age: 40
End: 2025-02-28
Payer: COMMERCIAL

## 2025-02-28 VITALS
OXYGEN SATURATION: 98 % | TEMPERATURE: 97 F | HEART RATE: 79 BPM | DIASTOLIC BLOOD PRESSURE: 78 MMHG | BODY MASS INDEX: 41.59 KG/M2 | SYSTOLIC BLOOD PRESSURE: 130 MMHG | WEIGHT: 265 LBS | HEIGHT: 67 IN

## 2025-02-28 DIAGNOSIS — I10 HYPERTENSION, ESSENTIAL: ICD-10-CM

## 2025-02-28 DIAGNOSIS — R53.83 OTHER FATIGUE: ICD-10-CM

## 2025-02-28 DIAGNOSIS — Z13.220 SCREENING, LIPID: ICD-10-CM

## 2025-02-28 DIAGNOSIS — E66.01 MORBID OBESITY WITH BMI OF 40.0-44.9, ADULT: ICD-10-CM

## 2025-02-28 DIAGNOSIS — M77.9 INFLAMMATION AROUND JOINT: ICD-10-CM

## 2025-02-28 DIAGNOSIS — Z00.00 ROUTINE GENERAL MEDICAL EXAMINATION AT A HEALTH CARE FACILITY: Primary | ICD-10-CM

## 2025-02-28 PROBLEM — E28.2 POLYCYSTIC OVARIAN SYNDROME: Status: ACTIVE | Noted: 2025-02-28

## 2025-02-28 PROCEDURE — 99395 PREV VISIT EST AGE 18-39: CPT | Performed by: NURSE PRACTITIONER

## 2025-02-28 PROCEDURE — 3075F SYST BP GE 130 - 139MM HG: CPT | Performed by: NURSE PRACTITIONER

## 2025-02-28 PROCEDURE — 3078F DIAST BP <80 MM HG: CPT | Performed by: NURSE PRACTITIONER

## 2025-02-28 SDOH — ECONOMIC STABILITY: FOOD INSECURITY: WITHIN THE PAST 12 MONTHS, YOU WORRIED THAT YOUR FOOD WOULD RUN OUT BEFORE YOU GOT MONEY TO BUY MORE.: NEVER TRUE

## 2025-02-28 SDOH — ECONOMIC STABILITY: FOOD INSECURITY: WITHIN THE PAST 12 MONTHS, THE FOOD YOU BOUGHT JUST DIDN'T LAST AND YOU DIDN'T HAVE MONEY TO GET MORE.: NEVER TRUE

## 2025-02-28 ASSESSMENT — PATIENT HEALTH QUESTIONNAIRE - PHQ9
SUM OF ALL RESPONSES TO PHQ QUESTIONS 1-9: 0
1. LITTLE INTEREST OR PLEASURE IN DOING THINGS: NOT AT ALL
2. FEELING DOWN, DEPRESSED OR HOPELESS: NOT AT ALL
SUM OF ALL RESPONSES TO PHQ QUESTIONS 1-9: 0

## 2025-02-28 NOTE — PROGRESS NOTES
Nikki Benz (:  1985) is a 39 y.o. female,Established patient, here for evaluation of the following chief complaint(s):  New Patient      SUBJECTIVE/OBJECTIVE:  HPI    Nikki Benz is a 39 y.o. female who presents here for well check and to establish care. Due for labs, screenings. Would like information for weight loss. Having joint swelling in knees and hands. Working on diet and exercise. Works full time in . Lives at home with  and kids. Denies problems with periods - regular. Denies problems with anxiety, sleep and depression.     Review of Systems   Constitutional:  Negative for activity change, appetite change, chills, fatigue and fever.   HENT:  Negative for congestion, ear discharge, ear pain, hearing loss, postnasal drip, rhinorrhea, sinus pressure, sinus pain, sneezing, sore throat, tinnitus and trouble swallowing.    Eyes: Negative.    Respiratory: Negative.     Cardiovascular: Negative.    Gastrointestinal:  Negative for abdominal pain, constipation, diarrhea, nausea and vomiting.   Genitourinary:  Negative for decreased urine volume, difficulty urinating, dysuria, flank pain, frequency, hematuria and urgency.   Musculoskeletal:  Positive for joint swelling. Negative for arthralgias, back pain, myalgias and neck pain.   Skin:  Negative for color change, rash and wound.   Allergic/Immunologic: Negative.    Neurological:  Negative for dizziness, weakness, light-headedness and headaches.   Psychiatric/Behavioral: Negative.         No Known Allergies  Current Outpatient Medications   Medication Sig Dispense Refill    SUMAtriptan (IMITREX) 50 MG tablet Take 1 tablet by mouth daily as needed for Migraine 9 tablet 11    lisinopril (PRINIVIL;ZESTRIL) 20 MG tablet Take 1 tablet by mouth daily 90 tablet 3    metFORMIN (GLUCOPHAGE-XR) 500 MG extended release tablet TAKE ONE TABLET BY MOUTH TWICE A  tablet 3     No current facility-administered medications for this

## 2025-03-03 DIAGNOSIS — R73.03 PREDIABETES: ICD-10-CM

## 2025-03-03 DIAGNOSIS — M77.9 INFLAMMATION AROUND JOINT: ICD-10-CM

## 2025-03-03 DIAGNOSIS — Z13.220 SCREENING, LIPID: ICD-10-CM

## 2025-03-03 DIAGNOSIS — R53.83 OTHER FATIGUE: ICD-10-CM

## 2025-03-03 LAB
25(OH)D3 SERPL-MCNC: 51.2 NG/ML
BASOPHILS # BLD: 0 K/UL (ref 0–0.2)
BASOPHILS NFR BLD: 0.5 %
CHOLEST SERPL-MCNC: 166 MG/DL (ref 0–199)
DEPRECATED RDW RBC AUTO: 13.8 % (ref 12.4–15.4)
EOSINOPHIL # BLD: 0.1 K/UL (ref 0–0.6)
EOSINOPHIL NFR BLD: 1.1 %
EST. AVERAGE GLUCOSE BLD GHB EST-MCNC: 102.5 MG/DL
FOLATE SERPL-MCNC: 7.47 NG/ML (ref 4.78–24.2)
HBA1C MFR BLD: 5.2 %
HCT VFR BLD AUTO: 38.8 % (ref 36–48)
HDLC SERPL-MCNC: 42 MG/DL (ref 40–60)
HGB BLD-MCNC: 13.1 G/DL (ref 12–16)
LDLC SERPL CALC-MCNC: 104 MG/DL
LYMPHOCYTES # BLD: 2.9 K/UL (ref 1–5.1)
LYMPHOCYTES NFR BLD: 36.1 %
MAGNESIUM SERPL-MCNC: 1.82 MG/DL (ref 1.8–2.4)
MCH RBC QN AUTO: 29.6 PG (ref 26–34)
MCHC RBC AUTO-ENTMCNC: 33.8 G/DL (ref 31–36)
MCV RBC AUTO: 87.4 FL (ref 80–100)
MONOCYTES # BLD: 0.5 K/UL (ref 0–1.3)
MONOCYTES NFR BLD: 6.6 %
NEUTROPHILS # BLD: 4.4 K/UL (ref 1.7–7.7)
NEUTROPHILS NFR BLD: 55.7 %
PLATELET # BLD AUTO: 228 K/UL (ref 135–450)
PMV BLD AUTO: 8.5 FL (ref 5–10.5)
RBC # BLD AUTO: 4.44 M/UL (ref 4–5.2)
T4 FREE SERPL-MCNC: 1.3 NG/DL (ref 0.9–1.8)
TRIGL SERPL-MCNC: 101 MG/DL (ref 0–150)
TSH SERPL DL<=0.005 MIU/L-ACNC: 1.84 UIU/ML (ref 0.27–4.2)
URATE SERPL-MCNC: 5.9 MG/DL (ref 2.6–6)
VIT B12 SERPL-MCNC: 425 PG/ML (ref 211–911)
VLDLC SERPL CALC-MCNC: 20 MG/DL
WBC # BLD AUTO: 8 K/UL (ref 4–11)

## 2025-03-04 ASSESSMENT — ENCOUNTER SYMPTOMS
CONSTIPATION: 0
SINUS PRESSURE: 0
RHINORRHEA: 0
DIARRHEA: 0
VOMITING: 0
BACK PAIN: 0
NAUSEA: 0
ALLERGIC/IMMUNOLOGIC NEGATIVE: 1
COLOR CHANGE: 0
EYES NEGATIVE: 1
ABDOMINAL PAIN: 0
RESPIRATORY NEGATIVE: 1
SORE THROAT: 0
TROUBLE SWALLOWING: 0
SINUS PAIN: 0

## 2025-04-24 ENCOUNTER — PATIENT MESSAGE (OUTPATIENT)
Dept: FAMILY MEDICINE CLINIC | Age: 40
End: 2025-04-24

## 2025-05-27 DIAGNOSIS — R53.83 OTHER FATIGUE: ICD-10-CM

## 2025-05-27 DIAGNOSIS — E66.01 MORBID OBESITY WITH BMI OF 40.0-44.9, ADULT (HCC): Primary | ICD-10-CM

## 2025-05-30 ENCOUNTER — OFFICE VISIT (OUTPATIENT)
Dept: FAMILY MEDICINE CLINIC | Age: 40
End: 2025-05-30

## 2025-05-30 VITALS
BODY MASS INDEX: 40.34 KG/M2 | OXYGEN SATURATION: 98 % | SYSTOLIC BLOOD PRESSURE: 116 MMHG | HEIGHT: 67 IN | HEART RATE: 75 BPM | WEIGHT: 257 LBS | DIASTOLIC BLOOD PRESSURE: 78 MMHG

## 2025-05-30 DIAGNOSIS — I10 HYPERTENSION, ESSENTIAL: Primary | ICD-10-CM

## 2025-05-30 DIAGNOSIS — E66.01 MORBID OBESITY WITH BMI OF 40.0-44.9, ADULT (HCC): ICD-10-CM

## 2025-05-30 RX ORDER — LISINOPRIL 10 MG/1
10 TABLET ORAL DAILY
Qty: 90 TABLET | Refills: 1 | Status: SHIPPED | OUTPATIENT
Start: 2025-05-30

## 2025-05-30 NOTE — ASSESSMENT & PLAN NOTE
- continue with the semaglutide - was doing weight watchers, had some success, moving to the 1 mg dose of semaglutide and will follow up

## 2025-05-30 NOTE — PROGRESS NOTES
Nikki Benz (:  1985) is a 40 y.o. female,Established patient, here for evaluation of the following chief complaint(s):  Follow-up (Discuss continuous monitor )         Assessment & Plan  Hypertension, essential   - decreased the dose from 20- 10 mg  - BP has been stable at home  -   BP Readings from Last 3 Encounters:   25 116/78   25 130/78   24 114/79         Orders:    lisinopril (PRINIVIL;ZESTRIL) 10 MG tablet; Take 1 tablet by mouth daily    Morbid obesity with BMI of 40.0-44.9, adult (HCC)   - continue with the semaglutide - was doing weight watchers, had some success, moving to the 1 mg dose of semaglutide and will follow up           No follow-ups on file.       Subjective   HPI    Patient presents today for follow up of medications. States has lost 8 pounds since being on the injection within 2 months. Continues with lisinopril and metformin. Denies issues. Feeling well on the medication. Working on diet and exercise and adding more protein.     Review of Systems   See HPI    Objective   Physical Exam  Vitals and nursing note reviewed.   Constitutional:       Appearance: Normal appearance.   Cardiovascular:      Rate and Rhythm: Normal rate and regular rhythm.   Pulmonary:      Effort: Pulmonary effort is normal.      Breath sounds: Normal breath sounds.   Musculoskeletal:         General: Normal range of motion.   Skin:     General: Skin is warm and dry.   Neurological:      General: No focal deficit present.      Mental Status: She is alert and oriented to person, place, and time.            On this date 2025 I have spent 25 minutes reviewing previous notes, test results and face to face with the patient discussing the diagnosis and importance of compliance with the treatment plan as well as documenting on the day of the visit.      An electronic signature was used to authenticate this note.    --Jackie Luciano, APRN - CNP

## 2025-06-04 ENCOUNTER — PATIENT MESSAGE (OUTPATIENT)
Dept: FAMILY MEDICINE CLINIC | Age: 40
End: 2025-06-04

## 2025-06-05 RX ORDER — HYDROCHLOROTHIAZIDE 12.5 MG/1
2 CAPSULE ORAL
Qty: 2 EACH | Refills: 5 | Status: SHIPPED | OUTPATIENT
Start: 2025-06-05

## 2025-06-13 DIAGNOSIS — R53.83 OTHER FATIGUE: ICD-10-CM

## 2025-06-13 DIAGNOSIS — E66.01 MORBID OBESITY WITH BMI OF 40.0-44.9, ADULT (HCC): ICD-10-CM

## 2025-06-16 NOTE — TELEPHONE ENCOUNTER
Medication:   Requested Prescriptions     Pending Prescriptions Disp Refills    Semaglutide-Weight Management (WEGOVY) 1 MG/0.5ML SOAJ SC injection 2 mL 0     Sig: Inject 2.5 mg into the skin every 7 days        Last Filled:      Patient Phone Number: [phone] (home)     Last appt: 5/30/2025   Next appt: Visit date not found    Last OARRS:        No data to display

## 2025-08-08 RX ORDER — LISINOPRIL 20 MG/1
20 TABLET ORAL DAILY
Qty: 90 TABLET | Refills: 3 | Status: SHIPPED | OUTPATIENT
Start: 2025-08-08

## 2025-09-02 ENCOUNTER — PATIENT MESSAGE (OUTPATIENT)
Dept: FAMILY MEDICINE CLINIC | Age: 40
End: 2025-09-02